# Patient Record
Sex: MALE | Race: WHITE | NOT HISPANIC OR LATINO | Employment: FULL TIME | URBAN - METROPOLITAN AREA
[De-identification: names, ages, dates, MRNs, and addresses within clinical notes are randomized per-mention and may not be internally consistent; named-entity substitution may affect disease eponyms.]

---

## 2019-02-01 ENCOUNTER — OFFICE VISIT - HEALTHEAST (OUTPATIENT)
Dept: FAMILY MEDICINE | Facility: CLINIC | Age: 59
End: 2019-02-01

## 2019-02-01 DIAGNOSIS — I49.9 IRREGULAR HEART BEAT: ICD-10-CM

## 2019-02-01 DIAGNOSIS — R07.9 CHEST PAIN, UNSPECIFIED TYPE: ICD-10-CM

## 2019-02-01 DIAGNOSIS — Z80.42 FAMILY HISTORY OF PROSTATE CANCER: ICD-10-CM

## 2019-02-01 DIAGNOSIS — R00.1 BRADYCARDIA: ICD-10-CM

## 2019-02-01 DIAGNOSIS — K40.90 INGUINAL HERNIA OF LEFT SIDE WITHOUT OBSTRUCTION OR GANGRENE: ICD-10-CM

## 2019-02-01 DIAGNOSIS — E78.00 HYPERCHOLESTEROLEMIA: ICD-10-CM

## 2019-02-01 DIAGNOSIS — R73.01 ELEVATED FASTING GLUCOSE: ICD-10-CM

## 2019-02-01 DIAGNOSIS — Z00.00 ROUTINE GENERAL MEDICAL EXAMINATION AT A HEALTH CARE FACILITY: ICD-10-CM

## 2019-02-01 LAB
ALBUMIN SERPL-MCNC: 4.3 G/DL (ref 3.5–5)
ALP SERPL-CCNC: 59 U/L (ref 45–120)
ALT SERPL W P-5'-P-CCNC: 20 U/L (ref 0–45)
ANION GAP SERPL CALCULATED.3IONS-SCNC: 13 MMOL/L (ref 5–18)
AST SERPL W P-5'-P-CCNC: 23 U/L (ref 0–40)
ATRIAL RATE - MUSE: 44 BPM
BILIRUB SERPL-MCNC: 0.5 MG/DL (ref 0–1)
BUN SERPL-MCNC: 23 MG/DL (ref 8–22)
CALCIUM SERPL-MCNC: 9.7 MG/DL (ref 8.5–10.5)
CHLORIDE BLD-SCNC: 105 MMOL/L (ref 98–107)
CHOLEST SERPL-MCNC: 200 MG/DL
CO2 SERPL-SCNC: 22 MMOL/L (ref 22–31)
CREAT SERPL-MCNC: 0.99 MG/DL (ref 0.7–1.3)
DIASTOLIC BLOOD PRESSURE - MUSE: NORMAL MMHG
FASTING STATUS PATIENT QL REPORTED: YES
GFR SERPL CREATININE-BSD FRML MDRD: >60 ML/MIN/1.73M2
GLUCOSE BLD-MCNC: 108 MG/DL (ref 70–125)
HBA1C MFR BLD: 5.5 % (ref 3.5–6)
HDLC SERPL-MCNC: 62 MG/DL
INTERPRETATION ECG - MUSE: NORMAL
LDLC SERPL CALC-MCNC: 126 MG/DL
MAGNESIUM SERPL-MCNC: 2.5 MG/DL (ref 1.8–2.6)
P AXIS - MUSE: 56 DEGREES
POTASSIUM BLD-SCNC: 4.6 MMOL/L (ref 3.5–5)
PR INTERVAL - MUSE: 170 MS
PROT SERPL-MCNC: 7.2 G/DL (ref 6–8)
PSA SERPL-MCNC: 1.5 NG/ML (ref 0–3.5)
QRS DURATION - MUSE: 108 MS
QT - MUSE: 428 MS
QTC - MUSE: 365 MS
R AXIS - MUSE: 34 DEGREES
SODIUM SERPL-SCNC: 140 MMOL/L (ref 136–145)
SYSTOLIC BLOOD PRESSURE - MUSE: NORMAL MMHG
T AXIS - MUSE: 34 DEGREES
TRIGL SERPL-MCNC: 60 MG/DL
TSH SERPL DL<=0.005 MIU/L-ACNC: 2.06 UIU/ML (ref 0.3–5)
VENTRICULAR RATE- MUSE: 44 BPM

## 2019-02-01 ASSESSMENT — MIFFLIN-ST. JEOR: SCORE: 1693.29

## 2019-02-07 ENCOUNTER — COMMUNICATION - HEALTHEAST (OUTPATIENT)
Dept: FAMILY MEDICINE | Facility: CLINIC | Age: 59
End: 2019-02-07

## 2019-02-14 ENCOUNTER — OFFICE VISIT - HEALTHEAST (OUTPATIENT)
Dept: CARDIOLOGY | Facility: CLINIC | Age: 59
End: 2019-02-14

## 2019-02-14 DIAGNOSIS — I49.9 IRREGULAR HEART BEAT: ICD-10-CM

## 2019-02-14 DIAGNOSIS — R07.2 PRECORDIAL PAIN: ICD-10-CM

## 2019-02-14 ASSESSMENT — MIFFLIN-ST. JEOR: SCORE: 1679.68

## 2019-02-15 ENCOUNTER — OFFICE VISIT - HEALTHEAST (OUTPATIENT)
Dept: SURGERY | Facility: CLINIC | Age: 59
End: 2019-02-15

## 2019-02-15 DIAGNOSIS — K40.90 LEFT INGUINAL HERNIA: ICD-10-CM

## 2019-02-15 ASSESSMENT — MIFFLIN-ST. JEOR: SCORE: 1699.64

## 2019-03-01 ENCOUNTER — HOSPITAL ENCOUNTER (OUTPATIENT)
Dept: CARDIOLOGY | Facility: HOSPITAL | Age: 59
Discharge: HOME | End: 2019-03-01
Attending: INTERNAL MEDICINE

## 2019-03-01 DIAGNOSIS — I49.9 IRREGULAR HEART BEAT: ICD-10-CM

## 2019-03-01 DIAGNOSIS — R07.2 PRECORDIAL PAIN: ICD-10-CM

## 2019-03-01 LAB
CV STRESS CURRENT BP HE: NORMAL
CV STRESS CURRENT HR HE: 100
CV STRESS CURRENT HR HE: 102
CV STRESS CURRENT HR HE: 103
CV STRESS CURRENT HR HE: 104
CV STRESS CURRENT HR HE: 104
CV STRESS CURRENT HR HE: 118
CV STRESS CURRENT HR HE: 123
CV STRESS CURRENT HR HE: 125
CV STRESS CURRENT HR HE: 127
CV STRESS CURRENT HR HE: 136
CV STRESS CURRENT HR HE: 137
CV STRESS CURRENT HR HE: 139
CV STRESS CURRENT HR HE: 141
CV STRESS CURRENT HR HE: 141
CV STRESS CURRENT HR HE: 142
CV STRESS CURRENT HR HE: 143
CV STRESS CURRENT HR HE: 143
CV STRESS CURRENT HR HE: 153
CV STRESS CURRENT HR HE: 164
CV STRESS CURRENT HR HE: 166
CV STRESS CURRENT HR HE: 169
CV STRESS CURRENT HR HE: 206
CV STRESS CURRENT HR HE: 218
CV STRESS CURRENT HR HE: 236
CV STRESS CURRENT HR HE: 58
CV STRESS CURRENT HR HE: 69
CV STRESS CURRENT HR HE: 82
CV STRESS CURRENT HR HE: 83
CV STRESS CURRENT HR HE: 84
CV STRESS CURRENT HR HE: 85
CV STRESS CURRENT HR HE: 88
CV STRESS CURRENT HR HE: 88
CV STRESS CURRENT HR HE: 90
CV STRESS CURRENT HR HE: 90
CV STRESS CURRENT HR HE: 91
CV STRESS CURRENT HR HE: 93
CV STRESS CURRENT HR HE: 94
CV STRESS CURRENT HR HE: 94
CV STRESS CURRENT HR HE: 96
CV STRESS CURRENT HR HE: 97
CV STRESS CURRENT HR HE: 98
CV STRESS CURRENT HR HE: 98
CV STRESS DEVIATION TIME HE: NORMAL
CV STRESS ECHO PERCENT HR HE: NORMAL
CV STRESS EXERCISE STAGE HE: NORMAL
CV STRESS FINAL RESTING BP HE: NORMAL
CV STRESS FINAL RESTING HR HE: 91
CV STRESS MAX HR HE: 245
CV STRESS MAX TREADMILL GRADE HE: 14
CV STRESS MAX TREADMILL SPEED HE: 3.4
CV STRESS PEAK DIA BP HE: NORMAL
CV STRESS PEAK SYS BP HE: NORMAL
CV STRESS PHASE HE: NORMAL
CV STRESS PROTOCOL HE: NORMAL
CV STRESS RESTING PT POSITION HE: NORMAL
CV STRESS RESTING PT POSITION HE: NORMAL
CV STRESS ST DEVIATION AMOUNT HE: NORMAL
CV STRESS ST DEVIATION ELEVATION HE: NORMAL
CV STRESS ST EVELATION AMOUNT HE: NORMAL
CV STRESS TEST TYPE HE: NORMAL
CV STRESS TOTAL STAGE TIME MIN 1 HE: NORMAL
ECHO EJECTION FRACTION ESTIMATED: 60 %
STRESS ECHO BASELINE BP: NORMAL
STRESS ECHO BASELINE HR: 59
STRESS ECHO CALCULATED PERCENT HR: 150 %
STRESS ECHO LAST STRESS BP: NORMAL
STRESS ECHO LAST STRESS HR: 169
STRESS ECHO POST ESTIMATED WORKLOAD: 8.7
STRESS ECHO POST EXERCISE DUR MIN: 7
STRESS ECHO POST EXERCISE DUR SEC: 15
STRESS ECHO TARGET HR: 139

## 2019-04-04 ENCOUNTER — OFFICE VISIT - HEALTHEAST (OUTPATIENT)
Dept: CARDIOLOGY | Facility: CLINIC | Age: 59
End: 2019-04-04

## 2019-04-04 DIAGNOSIS — R07.2 PRECORDIAL PAIN: ICD-10-CM

## 2019-04-04 DIAGNOSIS — I47.10 PSVT (PAROXYSMAL SUPRAVENTRICULAR TACHYCARDIA) (H): ICD-10-CM

## 2019-04-04 ASSESSMENT — MIFFLIN-ST. JEOR: SCORE: 1697.82

## 2019-06-03 ENCOUNTER — COMMUNICATION - HEALTHEAST (OUTPATIENT)
Dept: CARDIOLOGY | Facility: CLINIC | Age: 59
End: 2019-06-03

## 2019-06-03 DIAGNOSIS — I47.10 PSVT (PAROXYSMAL SUPRAVENTRICULAR TACHYCARDIA) (H): ICD-10-CM

## 2019-07-07 ENCOUNTER — RECORDS - HEALTHEAST (OUTPATIENT)
Dept: ADMINISTRATIVE | Facility: OTHER | Age: 59
End: 2019-07-07

## 2019-07-08 ENCOUNTER — OFFICE VISIT (OUTPATIENT)
Dept: FAMILY MEDICINE | Facility: CLINIC | Age: 59
End: 2019-07-08

## 2019-07-08 ENCOUNTER — COMMUNICATION - HEALTHEAST (OUTPATIENT)
Dept: FAMILY MEDICINE | Facility: CLINIC | Age: 59
End: 2019-07-08

## 2019-07-08 ENCOUNTER — RECORDS - HEALTHEAST (OUTPATIENT)
Dept: ADMINISTRATIVE | Facility: OTHER | Age: 59
End: 2019-07-08

## 2019-07-08 VITALS
BODY MASS INDEX: 26.6 KG/M2 | RESPIRATION RATE: 18 BRPM | OXYGEN SATURATION: 98 % | HEART RATE: 54 BPM | TEMPERATURE: 98.7 F | HEIGHT: 71 IN | WEIGHT: 190 LBS | SYSTOLIC BLOOD PRESSURE: 116 MMHG | DIASTOLIC BLOOD PRESSURE: 76 MMHG

## 2019-07-08 DIAGNOSIS — S52.501A CLOSED FRACTURE OF DISTAL END OF RIGHT RADIUS, UNSPECIFIED FRACTURE MORPHOLOGY, INITIAL ENCOUNTER: Primary | ICD-10-CM

## 2019-07-08 DIAGNOSIS — Z76.89 HEALTH CARE HOME: ICD-10-CM

## 2019-07-08 DIAGNOSIS — I47.10 SUPRAVENTRICULAR TACHYCARDIA (H): ICD-10-CM

## 2019-07-08 DIAGNOSIS — K40.90 UNILATERAL INGUINAL HERNIA WITHOUT OBSTRUCTION OR GANGRENE, RECURRENCE NOT SPECIFIED: ICD-10-CM

## 2019-07-08 DIAGNOSIS — Z71.89 ACP (ADVANCE CARE PLANNING): ICD-10-CM

## 2019-07-08 DIAGNOSIS — K21.9 GASTROESOPHAGEAL REFLUX DISEASE WITHOUT ESOPHAGITIS: ICD-10-CM

## 2019-07-08 DIAGNOSIS — Z01.818 PRE-OP EXAM: ICD-10-CM

## 2019-07-08 LAB — HEMOGLOBIN: 14.7 G/DL (ref 13.3–17.7)

## 2019-07-08 PROCEDURE — 36415 COLL VENOUS BLD VENIPUNCTURE: CPT | Performed by: FAMILY MEDICINE

## 2019-07-08 PROCEDURE — 93000 ELECTROCARDIOGRAM COMPLETE: CPT | Performed by: FAMILY MEDICINE

## 2019-07-08 PROCEDURE — 85018 HEMOGLOBIN: CPT | Performed by: FAMILY MEDICINE

## 2019-07-08 PROCEDURE — 99203 OFFICE O/P NEW LOW 30 MIN: CPT | Performed by: FAMILY MEDICINE

## 2019-07-08 RX ORDER — OMEPRAZOLE 20 MG/1
20 TABLET, DELAYED RELEASE ORAL DAILY
COMMUNITY
Start: 2019-07-08 | End: 2023-09-28

## 2019-07-08 RX ORDER — METOPROLOL TARTRATE 50 MG
25 TABLET ORAL
COMMUNITY
Start: 2019-06-03 | End: 2021-09-20

## 2019-07-08 SDOH — HEALTH STABILITY: MENTAL HEALTH: HOW MANY STANDARD DRINKS CONTAINING ALCOHOL DO YOU HAVE ON A TYPICAL DAY?: 1 OR 2

## 2019-07-08 SDOH — ECONOMIC STABILITY: FOOD INSECURITY: WITHIN THE PAST 12 MONTHS, THE FOOD YOU BOUGHT JUST DIDN'T LAST AND YOU DIDN'T HAVE MONEY TO GET MORE.: NEVER TRUE

## 2019-07-08 SDOH — ECONOMIC STABILITY: FOOD INSECURITY: WITHIN THE PAST 12 MONTHS, YOU WORRIED THAT YOUR FOOD WOULD RUN OUT BEFORE YOU GOT MONEY TO BUY MORE.: NEVER TRUE

## 2019-07-08 SDOH — ECONOMIC STABILITY: TRANSPORTATION INSECURITY
IN THE PAST 12 MONTHS, HAS THE LACK OF TRANSPORTATION KEPT YOU FROM MEDICAL APPOINTMENTS OR FROM GETTING MEDICATIONS?: NO

## 2019-07-08 SDOH — HEALTH STABILITY: PHYSICAL HEALTH: ON AVERAGE, HOW MANY MINUTES DO YOU ENGAGE IN EXERCISE AT THIS LEVEL?: 90 MIN

## 2019-07-08 SDOH — ECONOMIC STABILITY: TRANSPORTATION INSECURITY
IN THE PAST 12 MONTHS, HAS LACK OF TRANSPORTATION KEPT YOU FROM MEETINGS, WORK, OR FROM GETTING THINGS NEEDED FOR DAILY LIVING?: NO

## 2019-07-08 SDOH — HEALTH STABILITY: MENTAL HEALTH
STRESS IS WHEN SOMEONE FEELS TENSE, NERVOUS, ANXIOUS, OR CAN'T SLEEP AT NIGHT BECAUSE THEIR MIND IS TROUBLED. HOW STRESSED ARE YOU?: NOT AT ALL

## 2019-07-08 SDOH — ECONOMIC STABILITY: INCOME INSECURITY: HOW HARD IS IT FOR YOU TO PAY FOR THE VERY BASICS LIKE FOOD, HOUSING, MEDICAL CARE, AND HEATING?: NOT HARD AT ALL

## 2019-07-08 SDOH — HEALTH STABILITY: MENTAL HEALTH: HOW OFTEN DO YOU HAVE 6 OR MORE DRINKS ON ONE OCCASION?: NEVER

## 2019-07-08 SDOH — HEALTH STABILITY: PHYSICAL HEALTH: ON AVERAGE, HOW MANY DAYS PER WEEK DO YOU ENGAGE IN MODERATE TO STRENUOUS EXERCISE (LIKE A BRISK WALK)?: 5 DAYS

## 2019-07-08 SDOH — HEALTH STABILITY: MENTAL HEALTH: HOW OFTEN DO YOU HAVE A DRINK CONTAINING ALCOHOL?: 4 OR MORE TIMES A WEEK

## 2019-07-08 ASSESSMENT — MIFFLIN-ST. JEOR: SCORE: 1701.02

## 2019-07-08 NOTE — PROGRESS NOTES
[facility]  [address]  [address]  [facility] [address]  [phone]  Dept: [phone]    PRE-OP EVALUATION:  Today's date: 2019    Saurabh Morel (: 1960) presents for pre-operative evaluation assessment as requested by Dr. Angela.  He requires evaluation and anesthesia risk assessment prior to undergoing surgery/procedure for treatment of right wrist surgery.    Also left inguinal hernia    Proposed Surgery/ Procedure: right wrist surgery/ hernia repair  Date of Surgery/ Procedure: 19, hernia 2019  Time of Surgery/ Procedure: AM  Hospital/Surgical Facility: [facility] in [place], hernia [facility]  Fax number for surgical facility: [phone]  Primary Physician: Emerald Ackerman  Type of Anesthesia Anticipated: to be determined    Patient has a Health Care Directive or Living Will:  NO    1. NO - Do you have a history of heart attack, stroke, stent, bypass or surgery on an artery in the head, neck, heart or legs?  2. NO - Do you ever have any pain or discomfort in your chest?  3. NO - Do you have a history of  Heart Failure?  4. NO - Are you troubled by shortness of breath when: walking on the level, up a slight hill or at night?  5. NO - Do you currently have a cold, bronchitis or other respiratory infection?  6. NO - Do you have a cough, shortness of breath or wheezing?  7. NO - Do you sometimes get pains in the calves of your legs when you walk?  8. NO - Do you or anyone in your family have previous history of blood clots?  9. NO - Do you or does anyone in your family have a serious bleeding problem such as prolonged bleeding following surgeries or cuts?  10. NO - Have you ever had problems with anemia or been told to take iron pills?  11. NO - Have you had any abnormal blood loss such as black, tarry or bloody stools, or abnormal vaginal bleeding?  12. NO - Have you ever had a blood transfusion?  13. NO - Have you or any of your  relatives ever had problems with anesthesia?  14. NO - Do you have sleep apnea, excessive snoring or daytime drowsiness?  15. NO - Do you have any prosthetic heart valves?  16. NO - Do you have prosthetic joints?  17. NO - Is there any chance that you may be pregnant?      HPI:     HPI related to upcoming procedure: Right radial fracture care      See problem list for active medical problems.  Problems all longstanding and stable, except as noted/documented.  See ROS for pertinent symptoms related to these conditions.      MEDICAL HISTORY:     Patient Active Problem List    Diagnosis Date Noted     ACP (advance care planning) 07/08/2019     Priority: Medium     Paperwork given today       Supraventricular tachycardia (H) 07/08/2019     Priority: Medium     Unilateral inguinal hernia without obstruction or gangrene, recurrence not specified 07/08/2019     Priority: Medium     Gastroesophageal reflux disease without esophagitis 07/08/2019     Priority: Medium     Health Care Home 07/08/2019     Priority: Low      Past Medical History:   Diagnosis Date     Gastroesophageal reflux disease without esophagitis 7/8/2019     Supraventricular tachycardia (H) 7/8/2019     Unilateral inguinal hernia without obstruction or gangrene, recurrence not specified 7/8/2019     Past Surgical History:   Procedure Laterality Date     APPENDECTOMY  age 41's     HC TOOTH EXTRACTION W/FORCEP  age 19    wisdom     HERNIA REPAIR, INGUINAL RT/LT Right 2012     VASECTOMY  age 31's     Current Outpatient Medications   Medication Sig Dispense Refill     metoprolol tartrate (LOPRESSOR) 50 MG tablet Take 25 mg by mouth       omeprazole (PRILOSEC OTC) 20 MG EC tablet Take 1 tablet (20 mg) by mouth daily       OTC products: None, except as noted above    No Known Allergies   Latex Allergy: NO    Social History     Tobacco Use     Smoking status: Never Smoker     Smokeless tobacco: Never Used   Substance Use Topics     Alcohol use: Not on file  "    History   Drug Use Not on file       REVIEW OF SYSTEMS:   CONSTITUTIONAL: NEGATIVE for fever, chills, change in weight  ENT/MOUTH: NEGATIVE for ear, mouth and throat problems  RESP: NEGATIVE for significant cough or SOB  CV: NEGATIVE for chest pain, palpitations or peripheral edema    EXAM:   /76 (BP Location: Left arm, Patient Position: Sitting, Cuff Size: Adult Large)   Pulse 54   Temp 98.7  F (37.1  C) (Oral)   Resp 18   Ht 1.791 m (5' 10.5\")   Wt 86.2 kg (190 lb)   SpO2 98%   BMI 26.88 kg/m    GENERAL APPEARANCE: healthy, alert and no distress  HENT: ear canals and TM's normal and nose and mouth without ulcers or lesions  RESP: lungs clear to auscultation - no rales, rhonchi or wheezes  CV: regular rate and rhythm, normal S1 S2, no S3 or S4 and no murmur, click or rub   ABDOMEN: soft, nontender, no HSM or masses and bowel sounds normal  Groin not examined  NEURO: Normal strength and tone, sensory exam grossly normal, mentation intact and speech normal    DIAGNOSTICS:   EKG: sinus bradycardia, normal axis, normal intervals, no acute ST/T changes c/w ischemia, no LVH by voltage criteria, there are no prior tracings available  HGB:14.7 gm/dl       IMPRESSION:   Diagnosis/reason for consult: (S52.501A) Closed fracture of distal end of right radius, unspecified fracture morphology, initial encounter  (primary encounter diagnosis)    K40.90 Right inguinal hernia    (Z01.818) Pre-op exam      (I47.1) Supraventricular tachycardia (H)  Plan: well controlled with betablocker          The proposed surgical procedure is considered LOW risk.    REVISED CARDIAC RISK INDEX  The patient has the following serious cardiovascular risks for perioperative complications such as (MI, PE, VFib and 3  AV Block): Previous SVT now controlled with beta blocker  No serious cardiac risks  INTERPRETATION: 1 risks: Class II (low risk - 0.9% complication rate)    The patient has the following additional risks for perioperative " complications:  No identified additional risks      ICD-10-CM    1. Health Care Home Z76.89    2. ACP (advance care planning) Z71.89        RECOMMENDATIONS:     --Consult hospital rounder / IM to assist post-op medical management    --Patient is to take all scheduled medications on the day of surgery EXCEPT for modifications listed below.    APPROVAL GIVEN to proceed with proposed procedure, without further diagnostic evaluation       Signed Electronically by: Emerald Ackerman MD    Copy of this evaluation report is provided to requesting physician.

## 2019-07-08 NOTE — LETTER
[facility]                 [facility]  [address] [facility] [address]  [place]  [address]        For Emergencies:  Call 911      For Clinic Appointments:   [phone]                     [facility]  [address]  [address]  [facility] [id number] [phone]  Dept: [phone]    PRE-OP EVALUATION:  Today's date: 2019    Saurabh Morel (: 1960) presents for pre-operative evaluation assessment as requested by Dr. Angela.  He requires evaluation and anesthesia risk assessment prior to undergoing surgery/procedure for treatment of right wrist surgery.    Proposed Surgery/ Procedure: right wrist surgery  Date of Surgery/ Procedure: 19  Time of Surgery/ Procedure: AM  Hospital/Surgical Facility: [facility] in [place]  Fax number for surgical facility: [phone]  Primary Physician: Emerald Ackerman  Type of Anesthesia Anticipated: to be determined    Patient has a Health Care Directive or Living Will:  NO    1. NO - Do you have a history of heart attack, stroke, stent, bypass or surgery on an artery in the head, neck, heart or legs?  2. NO - Do you ever have any pain or discomfort in your chest?  3. NO - Do you have a history of  Heart Failure?  4. NO - Are you troubled by shortness of breath when: walking on the level, up a slight hill or at night?  5. NO - Do you currently have a cold, bronchitis or other respiratory infection?  6. NO - Do you have a cough, shortness of breath or wheezing?  7. NO - Do you sometimes get pains in the calves of your legs when you walk?  8. NO - Do you or anyone in your family have previous history of blood clots?  9. NO - Do you or does anyone in your family have a serious bleeding problem such as prolonged bleeding following surgeries or cuts?  10. NO - Have you ever had problems with anemia or been told to take iron pills?  11. NO - Have you had any abnormal  blood loss such as black, tarry or bloody stools, or abnormal vaginal bleeding?  12. NO - Have you ever had a blood transfusion?  13. NO - Have you or any of your relatives ever had problems with anesthesia?  14. NO - Do you have sleep apnea, excessive snoring or daytime drowsiness?  15. NO - Do you have any prosthetic heart valves?  16. NO - Do you have prosthetic joints?  17. NO - Is there any chance that you may be pregnant?      HPI:     HPI related to upcoming procedure: Right radial fracture care      See problem list for active medical problems.  Problems all longstanding and stable, except as noted/documented.  See ROS for pertinent symptoms related to these conditions.      MEDICAL HISTORY:     Patient Active Problem List    Diagnosis Date Noted     ACP (advance care planning) 07/08/2019     Priority: Medium     Paperwork given today       Supraventricular tachycardia (H) 07/08/2019     Priority: Medium     Unilateral inguinal hernia without obstruction or gangrene, recurrence not specified 07/08/2019     Priority: Medium     Gastroesophageal reflux disease without esophagitis 07/08/2019     Priority: Medium     Health Care Home 07/08/2019     Priority: Low      Past Medical History:   Diagnosis Date     Gastroesophageal reflux disease without esophagitis 7/8/2019     Supraventricular tachycardia (H) 7/8/2019     Unilateral inguinal hernia without obstruction or gangrene, recurrence not specified 7/8/2019     Past Surgical History:   Procedure Laterality Date     APPENDECTOMY  age 41's     HC TOOTH EXTRACTION W/FORCEP  age 19    wisdom     HERNIA REPAIR, INGUINAL RT/LT Right 2012     VASECTOMY  age 31's     Current Outpatient Medications   Medication Sig Dispense Refill     metoprolol tartrate (LOPRESSOR) 50 MG tablet Take 25 mg by mouth       omeprazole (PRILOSEC OTC) 20 MG EC tablet Take 1 tablet (20 mg) by mouth daily       OTC products: None, except as noted above    No Known Allergies   Latex Allergy:  "NO    Social History     Tobacco Use     Smoking status: Never Smoker     Smokeless tobacco: Never Used   Substance Use Topics     Alcohol use: Not on file     History   Drug Use Not on file       REVIEW OF SYSTEMS:   CONSTITUTIONAL: NEGATIVE for fever, chills, change in weight  ENT/MOUTH: NEGATIVE for ear, mouth and throat problems  RESP: NEGATIVE for significant cough or SOB  CV: NEGATIVE for chest pain, palpitations or peripheral edema    EXAM:   /76 (BP Location: Left arm, Patient Position: Sitting, Cuff Size: Adult Large)   Pulse 54   Temp 98.7  F (37.1  C) (Oral)   Resp 18   Ht 1.791 m (5' 10.5\")   Wt 86.2 kg (190 lb)   SpO2 98%   BMI 26.88 kg/m    GENERAL APPEARANCE: healthy, alert and no distress  HENT: ear canals and TM's normal and nose and mouth without ulcers or lesions  RESP: lungs clear to auscultation - no rales, rhonchi or wheezes  CV: regular rate and rhythm, normal S1 S2, no S3 or S4 and no murmur, click or rub   ABDOMEN: soft, nontender, no HSM or masses and bowel sounds normal  NEURO: Normal strength and tone, sensory exam grossly normal, mentation intact and speech normal    DIAGNOSTICS:   EKG: sinus bradycardia, normal axis, normal intervals, no acute ST/T changes c/w ischemia, no LVH by voltage criteria, there are no prior tracings available  HGB:14.7 gm/dl       IMPRESSION:   Diagnosis/reason for consult: (S52.501A) Closed fracture of distal end of right radius, unspecified fracture morphology, initial encounter  (primary encounter diagnosis)      (Z01.818) Pre-op exam      (I47.1) Supraventricular tachycardia (H)  Plan: well controlled with betablocker          The proposed surgical procedure is considered LOW risk.    REVISED CARDIAC RISK INDEX  The patient has the following serious cardiovascular risks for perioperative complications such as (MI, PE, VFib and 3  AV Block): Previous SVT now controlled with beta blocker  No serious cardiac risks  INTERPRETATION: 1 risks: Class " II (low risk - 0.9% complication rate)    The patient has the following additional risks for perioperative complications:  No identified additional risks      ICD-10-CM    1. Health Care Home Z76.89    2. ACP (advance care planning) Z71.89        RECOMMENDATIONS:     --Consult hospital rounder / IM to assist post-op medical management    --Patient is to take all scheduled medications on the day of surgery EXCEPT for modifications listed below.    APPROVAL GIVEN to proceed with proposed procedure, without further diagnostic evaluation       Signed Electronically by: Emerald Ackerman MD    Copy of this evaluation report is provided to requesting physician.

## 2019-07-08 NOTE — LETTER
[facility]                 [facility]  [address]  [place]  [address]        For Emergencies:  Call 911      For Clinic Appointments:   [phone]

## 2019-07-26 ENCOUNTER — RECORDS - HEALTHEAST (OUTPATIENT)
Dept: ADMINISTRATIVE | Facility: OTHER | Age: 59
End: 2019-07-26

## 2019-07-30 ENCOUNTER — TELEPHONE (OUTPATIENT)
Dept: FAMILY MEDICINE | Facility: CLINIC | Age: 59
End: 2019-07-30

## 2019-07-30 ENCOUNTER — ANESTHESIA - HEALTHEAST (OUTPATIENT)
Dept: SURGERY | Facility: HOSPITAL | Age: 59
End: 2019-07-30

## 2019-07-30 NOTE — TELEPHONE ENCOUNTER
"Dionna called back stating the pre-op if you choose to addend needs to state \"open inguinal hernia repair\"    ThanksAminah"

## 2019-07-30 NOTE — TELEPHONE ENCOUNTER
Saurabh is having left inguinal hernia repair tomorrow at [facility] and needs his wrist surgery pre-op from 7/8/19 to be addended for this surgery.  The surgeon is Dr. Khan.    Dionna the RN surgical admin called requesting this to be faxed to [phone].  Her number for any questions [phone].      Thanks, Yesenia

## 2019-07-31 ENCOUNTER — SURGERY - HEALTHEAST (OUTPATIENT)
Dept: SURGERY | Facility: HOSPITAL | Age: 59
End: 2019-07-31

## 2019-08-20 ENCOUNTER — OFFICE VISIT - HEALTHEAST (OUTPATIENT)
Dept: SURGERY | Facility: CLINIC | Age: 59
End: 2019-08-20

## 2019-08-20 ASSESSMENT — MIFFLIN-ST. JEOR: SCORE: 1700.1

## 2019-08-22 ENCOUNTER — COMMUNICATION - HEALTHEAST (OUTPATIENT)
Dept: FAMILY MEDICINE | Facility: CLINIC | Age: 59
End: 2019-08-22

## 2019-08-22 DIAGNOSIS — K21.9 GASTROESOPHAGEAL REFLUX DISEASE WITHOUT ESOPHAGITIS: ICD-10-CM

## 2019-08-23 ENCOUNTER — RECORDS - HEALTHEAST (OUTPATIENT)
Dept: ADMINISTRATIVE | Facility: OTHER | Age: 59
End: 2019-08-23

## 2019-10-04 ENCOUNTER — RECORDS - HEALTHEAST (OUTPATIENT)
Dept: ADMINISTRATIVE | Facility: OTHER | Age: 59
End: 2019-10-04

## 2019-12-03 ENCOUNTER — OFFICE VISIT - HEALTHEAST (OUTPATIENT)
Dept: CARDIOLOGY | Facility: CLINIC | Age: 59
End: 2019-12-03

## 2019-12-03 DIAGNOSIS — I47.10 PSVT (PAROXYSMAL SUPRAVENTRICULAR TACHYCARDIA) (H): ICD-10-CM

## 2019-12-03 ASSESSMENT — MIFFLIN-ST. JEOR: SCORE: 1686.49

## 2020-08-11 ENCOUNTER — COMMUNICATION - HEALTHEAST (OUTPATIENT)
Dept: FAMILY MEDICINE | Facility: CLINIC | Age: 60
End: 2020-08-11

## 2020-08-11 DIAGNOSIS — K21.9 GASTROESOPHAGEAL REFLUX DISEASE WITHOUT ESOPHAGITIS: ICD-10-CM

## 2020-09-02 ENCOUNTER — RECORDS - HEALTHEAST (OUTPATIENT)
Dept: ADMINISTRATIVE | Facility: OTHER | Age: 60
End: 2020-09-02

## 2020-09-10 ENCOUNTER — COMMUNICATION - HEALTHEAST (OUTPATIENT)
Dept: CARDIOLOGY | Facility: CLINIC | Age: 60
End: 2020-09-10

## 2020-09-10 DIAGNOSIS — I47.10 PSVT (PAROXYSMAL SUPRAVENTRICULAR TACHYCARDIA) (H): ICD-10-CM

## 2021-01-14 ENCOUNTER — COMMUNICATION - HEALTHEAST (OUTPATIENT)
Dept: CARDIOLOGY | Facility: CLINIC | Age: 61
End: 2021-01-14

## 2021-01-14 DIAGNOSIS — I47.10 PSVT (PAROXYSMAL SUPRAVENTRICULAR TACHYCARDIA) (H): ICD-10-CM

## 2021-01-28 ENCOUNTER — OFFICE VISIT - HEALTHEAST (OUTPATIENT)
Dept: CARDIOLOGY | Facility: CLINIC | Age: 61
End: 2021-01-28

## 2021-01-28 DIAGNOSIS — I47.10 PSVT (PAROXYSMAL SUPRAVENTRICULAR TACHYCARDIA) (H): ICD-10-CM

## 2021-01-28 DIAGNOSIS — R07.2 PRECORDIAL PAIN: ICD-10-CM

## 2021-01-28 ASSESSMENT — MIFFLIN-ST. JEOR: SCORE: 1700.1

## 2021-05-30 NOTE — ANESTHESIA PREPROCEDURE EVALUATION
Anesthesia Evaluation      Patient summary reviewed     Airway   Mallampati: II  Neck ROM: full   Pulmonary - negative ROS and normal exam    breath sounds clear to auscultation                         Cardiovascular - normal exam  (+) dysrhythmias, ,     Rhythm: regular  Rate: normal,         Neuro/Psych - negative ROS     Endo/Other - negative ROS      GI/Hepatic/Renal       Other findings: H/o PSVT, neg str ess test, nl labs, snores      Dental - normal exam                        Anesthesia Plan  Planned anesthetic: MAC  Ketamine, lyrica  ASA 2     Anesthetic plan and risks discussed with: patient    Post-op plan: routine recovery

## 2021-05-30 NOTE — TELEPHONE ENCOUNTER
New Appointment Needed  What is the reason for the visit:    Pre-Op Appt Request  When is the surgery? :  07/08/19  Where is the surgery?:   [facility]  Who is the surgeon? :  Dr. Lane  What type of surgery is being done?: Orthopedic  Provider Preference: Any available  How soon do you need to be seen?: today  Waitlist offered?: No  Okay to leave a detailed message:  Yes. Scheduled appointment with Dr. Sands tomorrow with patient's wife, but [facility] called saying the surgery is happening the morning of 07/09/19/

## 2021-05-31 NOTE — ANESTHESIA CARE TRANSFER NOTE
Last vitals:   Vitals:    07/31/19 0904   BP: (P) 122/74   Pulse: (P) 70   Resp: (P) 14   Temp: 36.6  C (97.8  F)   SpO2: (P) 97%     Patient's level of consciousness is drowsy  Spontaneous respirations: yes  Maintains airway independently: yes  Dentition unchanged: yes  Oropharynx: oropharynx clear of all foreign objects    QCDR Measures:  ASA# 20 - Surgical Safety Checklist: WHO surgical safety checklist completed prior to induction    PQRS# 430 - Adult PONV Prevention: 4558F - Pt received => 2 anti-emetic agents (different classes) preop & intraop  ASA# 8 - Peds PONV Prevention: NA - Not pediatric patient, not GA or 2 or more risk factors NOT present  PQRS# 424 - Gale-op Temp Management: 4559F - At least one body temp DOCUMENTED => 35.5C or 95.9F within required timeframe  PQRS# 426 - PACU Transfer Protocol: - Transfer of care checklist used  ASA# 14 - Acute Post-op Pain: ASA14B - Patient did NOT experience pain >= 7 out of 10

## 2021-05-31 NOTE — PROGRESS NOTES
"HPI: Pt is here for follow up of a left inguinal hernia repair three weeks ago with Dr Hutson.   he is doing well.  Pain is well controlled.  No difficulties with the surgical wound/wounds.  he is eating well and denies fever and chills.         /90 (Patient Site: Right Arm, Patient Position: Sitting, Cuff Size: Adult Regular)   Pulse 62   Ht 5' 10.5\" (1.791 m)   Wt 192 lb (87.1 kg)   SpO2 97%   BMI 27.16 kg/m      EXAM:  GENERAL:Appears well  ABDOMEN:  Soft, +BS  SURGICAL WOUNDS:  Incisions healing well, no enduration or drainage.        Assessment/Plan: . Doing well after surgery and should follow up as needed.  Radha Vann PA-C  [facility] Department of Surgery      "

## 2021-05-31 NOTE — ANESTHESIA POSTPROCEDURE EVALUATION
Patient: Saurabh Morel  OPEN LEFT INGUINAL HERNIA REPAIR WITH MESH  Anesthesia type: MAC    Patient location: home  Last vitals:   Vitals Value Taken Time   /80 7/31/2019 10:00 AM   Temp 36.6  C (97.8  F) 7/31/2019  9:04 AM   Pulse 58 7/31/2019 10:08 AM   Resp 16 7/31/2019 10:00 AM   SpO2 98 % 7/31/2019 10:08 AM   Vitals shown include unvalidated device data.  Post vital signs: stable  Level of consciousness: awake and responds to simple questions  Post-anesthesia pain: pain controlled  Post-anesthesia nausea and vomiting: no  Pulmonary: unassisted, return to baseline  Cardiovascular: stable and blood pressure at baseline  Hydration: adequate  Anesthetic events: no    QCDR Measures:  ASA# 11 - Gale-op Cardiac Arrest: ASA11B - Patient did NOT experience unanticipated cardiac arrest  ASA# 12 - Gale-op Mortality Rate: ASA12B - Patient did NOT die  ASA# 13 - PACU Re-Intubation Rate: ASA13B - Patient did NOT require a new airway mgmt  ASA# 10 - Composite Anes Safety: ASA10A - No serious adverse event    Additional Notes:

## 2021-05-31 NOTE — TELEPHONE ENCOUNTER
Medication Request  Medication name:     omeprazole (PRILOSEC) 20 MG capsule 90 capsule 3 12/2/2015  No   Sig - Route: Take 1 capsule (20 mg total) by mouth daily. - Oral   E-Prescribing Status: Receipt confirmed by pharmacy (12/2/2015  8:10 AM CST)       Pharmacy Name and Location: [id number] [address] AT [facility] [address]   Reason for request: The patient has been buying the above prescription over the counter. The insurance max has been met and now the patient would like to have it billed through the insurance company.   When did you use medication last?:  8/22/2019  Patient offered appointment:  patient declined  Okay to leave a detailed message: yes

## 2021-06-02 VITALS — HEIGHT: 72 IN | BODY MASS INDEX: 25.52 KG/M2 | WEIGHT: 188.4 LBS

## 2021-06-02 VITALS — WEIGHT: 184 LBS | BODY MASS INDEX: 24.92 KG/M2 | HEIGHT: 72 IN

## 2021-06-02 VITALS — BODY MASS INDEX: 25.47 KG/M2 | WEIGHT: 188 LBS | HEIGHT: 72 IN

## 2021-06-02 VITALS — HEIGHT: 72 IN | WEIGHT: 187 LBS | BODY MASS INDEX: 25.33 KG/M2

## 2021-06-03 ENCOUNTER — COMMUNICATION - HEALTHEAST (OUTPATIENT)
Dept: FAMILY MEDICINE | Facility: CLINIC | Age: 61
End: 2021-06-03

## 2021-06-03 VITALS — HEIGHT: 71 IN | WEIGHT: 192 LBS | BODY MASS INDEX: 26.88 KG/M2

## 2021-06-03 VITALS — BODY MASS INDEX: 26.13 KG/M2 | WEIGHT: 190 LBS | BODY MASS INDEX: 26.13 KG/M2 | WEIGHT: 190 LBS

## 2021-06-03 VITALS — HEIGHT: 71 IN | BODY MASS INDEX: 26.6 KG/M2 | WEIGHT: 190 LBS

## 2021-06-03 VITALS — HEIGHT: 72 IN | BODY MASS INDEX: 26.13 KG/M2 | HEIGHT: 72 IN | BODY MASS INDEX: 26.13 KG/M2

## 2021-06-03 NOTE — PROGRESS NOTES
[facility] HEART [facility]  Arrhythmia Clinic  Jimmy José    Referring: Dr.St Torres     Assessment:         PSVT: The patient has had trivial burden of arrhythmia over the past 12 months.  He had one symptomatic episode lasting less than 30 seconds.  The patient is tolerating his beta-blocker well.  The patient is aware of the availability of mapping and ablation of his arrhythmia but at this point continues to desire a conservative approach.    Cough and chest ache: The patient has had a chronic cough and chest ache which has been followed with by his primary.  The patient has some achiness with coughing but no clear anginal symptoms.  He does have a family history (father) but no other significant risk factors for CAD.      Recommendations:    The patient's beta-blocker will be changed from metoprolol tartrate to metoprolol succinate 25 mg daily for ease of compliance.    Follow-up with me in 1 year.  The patient will contact us if he has recurrent and troublesome symptoms in the interim.    I did asked the patient to contact us if his chest symptoms do not improve as he may be a candidate for cardiac CT angiogram to help sort through whether or not he might have any coronary disease.      Patient Active Problem List   Diagnosis     Gastroesophageal reflux disease without esophagitis     Family history of prostate cancer     Left inguinal hernia     Elevated fasting glucose     Hypercholesterolemia     Routine general medical examination at a [facility] facility     PSVT (paroxysmal supraventricular tachycardia) (H)     Precordial pain       Subjective:  Saurabh Morel (59 y.o. male) returns to the arrhythmia clinic for interval reevaluation of his PSVT.  The patient reports a single episode of heart racing earlier this summer.  The episode lasted less than 30 seconds and resolve spontaneously.  Otherwise he continues to be active active at his work.  He reports no orthopnea, PND or ankle edema.    Current  Outpatient Medications   Medication Sig Dispense Refill     ibuprofen (ADVIL,MOTRIN) 200 MG tablet Take 800 mg by mouth every morning.       multivitamin with minerals (THERA-M) 9 mg iron-400 mcg Tab tablet Take 1 tablet by mouth daily.       omeprazole (PRILOSEC) 20 MG capsule Take 1 capsule (20 mg total) by mouth daily. 90 capsule 3     metoprolol succinate (TOPROL-XL) 50 MG 24 hr tablet Take 0.5 tablets (25 mg total) by mouth daily. 30 tablet 3     No current facility-administered medications for this visit.        Review of Systems:   General: WNL  Eyes: WNL  Ears/Nose/Throat: WNL  Lungs: Cough  Heart: Chest Pain, Irregular Heartbeat  Stomach: WNL  Bladder: WNL  Muscle/Joints: WNL  Skin: WNL  Nervous System: WNL  Mental Health: WNL     Blood: WNL    Family History  Family History   Problem Relation Age of Onset     Hyperlipidemia Mother      Hypertension Mother      Liver cancer Mother      Parkinsonism Mother      Hyperlipidemia Father      Hypertension Father      Heart disease Father 94     CABG Father      Breast cancer Sister         ~55     Hyperlipidemia Brother      Hypertension Brother      Pancreatic cancer Paternal Aunt      Prostate cancer Paternal Grandfather      Hyperlipidemia Sister      Hypertension Sister      Hyperlipidemia Brother      Hypertension Brother      Esophageal cancer Brother 54     Other Brother 24        MVA     Hypertension Brother      Heart failure Paternal Aunt      Pancreatic cancer Paternal Aunt      No Medical Problems Daughter      No Medical Problems Son      No Medical Problems Son      Colon cancer Neg Hx      Drug abuse Neg Hx      Donna Parkinson White syndrome Neg Hx        Social History   reports that he has never smoked. He has never used smokeless tobacco. He reports current alcohol use of about 16.0 standard drinks of alcohol per week. He reports that he does not use drugs.    Objective:   Vital signs in last 24 hours:  /70 (Patient Site: Left Arm,  "Patient Position: Sitting, Cuff Size: Adult Regular)   Pulse (!) 58   Resp 14   Ht 5' 10.5\" (1.791 m)   Wt 189 lb (85.7 kg)   BMI 26.74 kg/m    Weight: Weight: 189 lb (85.7 kg)     Physical Exam:  General: The patient is alert oriented to person place and situation.  The patient is in no acute distress at the time of my evaluation.  Eyes: Pupils are equal, round, and reactive to light.  Conjunctiva and sclera are clear.  ENT: Oral mucosa is moist and without redness. No evident nasal discharge.  Pulmonary: Lungs are clear bilaterally with no rales, rhonchi, or wheezes.    Cardiovascular exam: Rhythm is regular. S1 and S2 are normal. No significant murmur is present. JVP is normal. Lower extremities demonstrate no significant edema. Distal pulses are intact bilaterally.  Abdomen is flat, soft, and nontender.  Musculoskeletal: Spine is straight. Extremities without deformity.  Neuro: Gait is normal.   Skin is warm, dry, and otherwise intact.      Cardiographics:       Lab Results:   Lab Results   Component Value Date     02/01/2019    K 4.6 02/01/2019     02/01/2019    CO2 22 02/01/2019    BUN 23 (H) 02/01/2019    CREATININE 0.99 02/01/2019    CALCIUM 9.7 02/01/2019     Lab Results   Component Value Date    WBC 4.7 12/03/2015    WBC 3.8 (L) 12/26/2014    HGB 15.1 12/03/2015    HCT 44.0 12/03/2015    MCV 87 12/03/2015     12/03/2015     No results found for: INR      Outside record review:        "

## 2021-06-04 VITALS
HEIGHT: 71 IN | DIASTOLIC BLOOD PRESSURE: 70 MMHG | HEART RATE: 58 BPM | BODY MASS INDEX: 26.46 KG/M2 | SYSTOLIC BLOOD PRESSURE: 116 MMHG | RESPIRATION RATE: 14 BRPM | WEIGHT: 189 LBS

## 2021-06-05 VITALS
SYSTOLIC BLOOD PRESSURE: 120 MMHG | BODY MASS INDEX: 26.88 KG/M2 | WEIGHT: 192 LBS | HEART RATE: 50 BPM | HEIGHT: 71 IN | RESPIRATION RATE: 16 BRPM | DIASTOLIC BLOOD PRESSURE: 80 MMHG

## 2021-06-10 NOTE — TELEPHONE ENCOUNTER
RN cannot approve Refill Request    RN can NOT refill this medication Protocol failed and NO refill given. Last office visit: Visit date not found Last Physical: Visit date not found Last MTM visit: Visit date not found Last visit same specialty: Visit date not found.  Next visit within 3 mo: Visit date not found  Next physical within 3 mo: Visit date not found      Lamar Loza, [facility] Connection Triage/Med Refill 8/11/2020    Requested Prescriptions   Pending Prescriptions Disp Refills     omeprazole (PRILOSEC) 20 MG capsule [Pharmacy Med Name: OMEPRAZOLE 20MG CAPSULES] 90 capsule 3     Sig: TAKE 1 CAPSULE(20 MG) BY MOUTH DAILY       GI Medications Refill Protocol Failed - 8/11/2020  3:29 AM        Failed - PCP or prescribing provider visit in last 12 or next 3 months.     Last office visit with prescriber/PCP: Visit date not found OR same dept: Visit date not found OR same specialty: Visit date not found  Last physical: Visit date not found Last MTM visit: Visit date not found   Next visit within 3 mo: Visit date not found  Next physical within 3 mo: Visit date not found  Prescriber OR PCP: Ines Shin MD  Last diagnosis associated with med order: 1. Gastroesophageal reflux disease without esophagitis  - omeprazole (PRILOSEC) 20 MG capsule [Pharmacy Med Name: OMEPRAZOLE 20MG CAPSULES]; TAKE 1 CAPSULE(20 MG) BY MOUTH DAILY  Dispense: 90 capsule; Refill: 3    If protocol passes may refill for 12 months if within 3 months of last provider visit (or a total of 15 months).

## 2021-06-18 NOTE — LETTER
Letter by Willam Mooney MD at      Author: Willam Mooney MD Service: -- Author Type: --    Filed:  Encounter Date: 2/7/2019 Status: (Other)       Saurabh Morel  [address] [place] [id number]             February 7, 2019         Dear Mr. Morel,    Below are the results from your recent visit:    Resulted Orders   Lipid Profile   Result Value Ref Range    Triglycerides 60 <=149 mg/dL    Cholesterol 200 (H) <=199 mg/dL    LDL Calculated 126 <=129 mg/dL    HDL Cholesterol 62 >=40 mg/dL    Patient Fasting > 8hrs? Yes    PSA (Prostatic-Specific Antigen), Annual Screen   Result Value Ref Range    PSA 1.5 0.0 - 3.5 ng/mL    Narrative    Method is Abbott Prostate-Specific Antigen (PSA)  Standard-WHO 1st International (90:10)   Glycosylated Hemoglobin A1c   Result Value Ref Range    Hemoglobin A1c 5.5 3.5 - 6.0 %   Thyroid Stimulating Hormone (TSH)   Result Value Ref Range    TSH 2.06 0.30 - 5.00 uIU/mL   Comprehensive Metabolic Panel   Result Value Ref Range    Sodium 140 136 - 145 mmol/L    Potassium 4.6 3.5 - 5.0 mmol/L    Chloride 105 98 - 107 mmol/L    CO2 22 22 - 31 mmol/L    Anion Gap, Calculation 13 5 - 18 mmol/L    Glucose 108 70 - 125 mg/dL    BUN 23 (H) 8 - 22 mg/dL    Creatinine 0.99 0.70 - 1.30 mg/dL    GFR MDRD Af Amer >60 >60 mL/min/1.73m2    GFR MDRD Non Af Amer >60 >60 mL/min/1.73m2    Bilirubin, Total 0.5 0.0 - 1.0 mg/dL    Calcium 9.7 8.5 - 10.5 mg/dL    Protein, Total 7.2 6.0 - 8.0 g/dL    Albumin 4.3 3.5 - 5.0 g/dL    Alkaline Phosphatase 59 45 - 120 U/L    AST 23 0 - 40 U/L    ALT 20 0 - 45 U/L    Narrative    Fasting Glucose reference range is 70-99 mg/dL per  American Diabetes Association (ADA) guidelines.   Magnesium   Result Value Ref Range    Magnesium 2.5 1.8 - 2.6 mg/dL     Your cholesterol panel overall looks quite good.  Given these numbers, I do not recommend a cholesterol lowering medication at this time.     Your comprehensive metabolic panel was normal.  This shows your  kidney function, electrolytes, liver function (for comprehensive only).     - your electrolytes were normal.     - your kidney function was normal.     - your liver functions was normal.      Your PSA was normal.  This is a screening test for prostate cancer.    Your TSH was normal.  TSH stands for thyroid stimulating hormone which is a measure of thyroid function.  We checked this because of the abnormal heart beats that you are describing.  There is no need for additional thyroid testing at this time.    Your hemoglobin A1c, a 3-month average of blood sugars, was in the normal range.  I recommend that we recheck it in 1 year given that your fasting glucose level was greater than 100.  This suggests that you are at risk for developing prediabetes.    Please call with questions or contact us using [employer].    Sincerely,        Electronically signed by Willam Mooney MD

## 2021-06-24 NOTE — PROGRESS NOTES
History:  Saurabh Morel is a 58 y.o. male who was referred for an inguinal hernia. He  presents today with complaints of symptoms within his left inguinal region.  It has been present for about a year intermittently.  He notes having a bulge in the left inguinal region.  He can lay down and massage it back in.  He also feels like he has some digestion problems when it is sticking out.  He is self-employed and does a lot of manual labor.  Yesterday when he was shoveling snow he could feel an increase in dull discomfort in the left inguinal region.  He does have a history of a right inguinal hernia.  This was repaired laparoscopically in 2015.    As a side note, the patient recently saw his primary.  He had mentioned some intermittent right chest pain and palpitations.  He saw cardiology yesterday.  He is waiting to set up a stress echo.    Allergies:  Demerol [meperidine]    Past Medical History:  GERD    Past Surgical History:  Open appendectomy  Laparoscopic right inguinal hernia repair    Medications:  Multivitamin  Prilosec    Family History:  Family History   Problem Relation Age of Onset     Hyperlipidemia Mother      Hypertension Mother      Liver cancer Mother      Parkinsonism Mother      Hyperlipidemia Father      Hypertension Father      Heart disease Father      Breast cancer Sister         ~55     Hyperlipidemia Brother      Hypertension Brother      Pancreatic cancer Paternal Aunt      Prostate cancer Paternal Grandfather      Hyperlipidemia Sister      Hypertension Sister      Hyperlipidemia Brother      Hypertension Brother      Hyperlipidemia Brother      Hypertension Brother      No Medical Problems Brother      No Medical Problems Brother      Heart failure Paternal Aunt      Pancreatic cancer Paternal Aunt      No Medical Problems Daughter      No Medical Problems Son      No Medical Problems Son      Colon cancer Neg Hx      Drug abuse Neg Hx      Donna Parkinson White syndrome Neg Hx   "      Social History:   reports that  has never smoked. he has never used smokeless tobacco. He reports that he drinks about 12.6 oz of alcohol per week. He reports that he does not use drugs.    Review of Systems:   General: No complaints or constitutional symptoms  Skin: No complaints or symptoms   Hematologic/Lymphatic: No symptoms or complaints  Psychiatric: No symptoms or complaints  Endocrine: No excessive fatigue, no hypermetabolic symptoms reported  Respiratory: No cough, shortness of breath, or wheezing  Cardiovascular: Intermittent chest pain  Gastrointestinal: As per HPI  Musculoskeletal: No recent injuries reported  Neurological: No focal neurologic defects reported.        Exam:  /69 (Patient Site: Right Arm, Patient Position: Sitting, Cuff Size: Adult Regular)   Pulse 72   Ht 5' 11.5\" (1.816 m)   Wt 188 lb 6.4 oz (85.5 kg)   SpO2 98%   BMI 25.91 kg/m    Body mass index is 25.91 kg/m .  General: Alert, cooperative, appears stated age   Skin: Skin color, texture, turgor normal, no rashes or lesions   Lymphatic: No obvious adenopathy, no swelling   Eyes: No scleral icterus, pupils equal  HENT: No traumatic injury to the head or face, no gross abnormalities  Lungs: Normal respiratory effort, breath sounds equal bilaterally  Heart: Regular rate and rhythm  Abdomen: Soft and nontender.  No umbilical or right inguinal hernia with Valsalva.  With Valsalva maneuver there is a small yet easily palpable and reducible left inguinal hernia.  Musculoskeletal: No obvious swelling  Neurologic: Grossly intact    Assessment/Plan:   1. Left inguinal hernia        Saurabh Morel is a 58 y.o. male with a left inguinal hernia.  I have discussed the pathophysiology of inguinal hernias at length as well as the surgical and non-operative management strategies.      In particular, the risks and benefits of laparoscopic vs. open inguinal hernia surgery were explained in detail which include, but are not limited to, " bleeding, infection of the mesh, recurrence of the hernia, chronic pain, poor cosmesis, the need for reoperative intervention, injury to vital structures, blood clots, heart attack, stroke and death.  Additionally, the risks of observation were also discussed in detail which include, but are not limited to, chronic pain, enlargement of the hernia, incarceration, strangulation and death.      Due to his history of laparoscopic inguinal hernia repair, the left inguinal hernia would ideally be approached from the open standpoint.  The patient was expecting this answer and is okay proceeding with an open left inguinal hernia repair.  Due to his recent possible cardiac issues, he will need to obtain clearance from cardiology before we schedule surgery.  He is hoping to get the stress echo done within a few weeks.  Once he obtains clearance, he can call our surgery scheduler and then we can schedule him within a couple weeks of that call around his desired date.  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      We discussed postoperative recovery and restrictions.  We will await his call back and then schedule an open inguinal hernia repair under MAC anesthetic at the outpatient surgery center.    Yesenia Hutson, DO  [facility]  [phone]

## 2021-06-25 NOTE — TELEPHONE ENCOUNTER
Prior Authorization Request  Who s requesting:  Pharmacy  [facility]   Medication Name: omeprazole     Insurance Plan: [employer]  Insurance Member ID Number:  [id number]  CoverMyMeds Key: N/A  Informed patient that prior authorizations can take up to 10 business days for response:   Yes  Okay to leave a detailed message: Yes

## 2021-06-27 NOTE — PROGRESS NOTES
Progress Notes by Fabio Cat MD at 2/14/2019  3:20 PM     Author: Fabio Cat MD Service: -- Author Type: Physician    Filed: 2/14/2019  4:09 PM Encounter Date: 2/14/2019 Status: Signed    : Fabio Cat MD (Physician)           Click to link to [facility] Heart [facility] HEART CARE NOTE    Thank you, Dr. Mooney, for asking us to see Saurabh Morel at the [facility] Heart Care Clinic.      Assessment/Recommendations   Shunt with atypical symptoms which may be related to myocardial ischemia but again atypical for this.  This could also be intermittent dysrhythmias such as atrial fibrillation or SVT.    I have recommended that he come in his heart rate the next time this happens and asked somebody else to count and if helpful.  He thinks that they may have a fit bit or other device at home and have asked him to wear it so the fit bit or other device could count the heart rate as well.    I would like to start with a stress echocardiogram to see if we can initiate any rhythm disturbances and to see if there is any evidence of structural heart disease or myocardial ischemia.    We talked about the possibility of a 30-day event recorder or even a [employer] this system but we will await the results of the stress echocardiogram and his findings when he comes to the heart rate.    His TSH was normal electrolytes were unremarkable.    Thank you for allowing us to participate in his care.         History of Present Illness    Mr. Saurabh Morel is a 58 y.o. male without known heart problems but does have a family history with his father having bypass surgery at age 60.  Over the last year he has had about 6 episodes that he describes in the following fashion.  He will get a discomfort in his chest which feels like a dull ache slightly to the right of the midline.  It is not severe in nature but is mild to moderate in nature.  Comes on rather abruptly.  He can be associated with him walking  around or moving around but it does not come on when he does the most heavy is physical exertion such as shoveling snow or shoveling soybeans.  It is not associated with dyspnea, diaphoresis, nausea or vomiting and does not radiate.  At that time is also checked his pulse and it seems fast and irregular.  He did not count it but it is just not normal.  The discomfort and the irregular heartbeat will last about 5 minutes and then will spontaneously go away.  This is happened about 6 times in the last 1 year.  It is unpredictable.  It does not typically happen at night.    He denies orthopnea, paroxysmal nocturnal dyspnea, peripheral edema, syncope or near syncopal episodes.  He has no history of rheumatic fever, cerebrovascular accident or TIA.    He does not have hypertension, diabetes and is never smoked cigarettes.  His father had bypass surgery at age 60s and his lipid panel is mildly elevated as noted below.    ECG: Personally reviewed.  Sinus bradycardia but otherwise unremarkable.     Physical Examination Review of Systems   Vitals:    02/14/19 1527   BP: 122/80   Pulse: 60   Resp: 14     Body mass index is 25.31 kg/m .  Wt Readings from Last 3 Encounters:   02/14/19 184 lb (83.5 kg)   02/01/19 187 lb (84.8 kg)   12/02/15 182 lb (82.6 kg)     General Appearance:   Alert, cooperative and in no acute distress.   ENT/Mouth: Oral mucuos membranes pink and moist .      EYES:  No scleral icterus. No Xanthelasma.    Neck: JVP normal. No Hepatojugular reflux. Thyroid not visualized   Chest/Lungs:   Lungs are clear to auscultation, equal chest wall expansion    Cardiovascular:   S1, S2 without murmur ,clicks or rubs. Brachial, radial and posterior tibial pulses are intact and symetric. No carotid bruits noted   Abdomen:  Nontender. BS+.      Extremities: No cyanosis, clubbing or edema   Skin: no xanthelasma, warm.    Psych: Appropriate affect.   Neurologic: normal gait, normal  bilateral, no tremors        General:  WNL  Eyes: WNL  Ears/Nose/Throat: WNL  Lungs: Snoring  Heart: Chest Pain, Irregular Heartbeat  Stomach: Abdominal Pain  Bladder: Frequent Urination at Night  Muscle/Joints: WNL  Skin: WNL  Nervous System: WNL  Mental Health: WNL     Blood: WNL     Medical History  Surgical History Family History Social History   Past Medical History:   Diagnosis Date   ? GERD (gastroesophageal reflux disease)     Past Surgical History:   Procedure Laterality Date   ? APPENDECTOMY  15yrs ago   ? HERNIA REPAIR      Family History   Problem Relation Age of Onset   ? Hyperlipidemia Mother    ? Hypertension Mother    ? Liver cancer Mother    ? Parkinsonism Mother    ? Hyperlipidemia Father    ? Hypertension Father    ? Heart disease Father    ? Breast cancer Sister         ~55   ? Hyperlipidemia Brother    ? Hypertension Brother    ? Pancreatic cancer Paternal Aunt    ? Prostate cancer Paternal Grandfather    ? Hyperlipidemia Sister    ? Hypertension Sister    ? Hyperlipidemia Brother    ? Hypertension Brother    ? Hyperlipidemia Brother    ? Hypertension Brother    ? No Medical Problems Brother    ? No Medical Problems Brother    ? Heart failure Paternal Aunt    ? Pancreatic cancer Paternal Aunt    ? No Medical Problems Daughter    ? No Medical Problems Son    ? No Medical Problems Son    ? Colon cancer Neg Hx    ? Drug abuse Neg Hx    ? Donna Parkinson White syndrome Neg Hx     Social History     Socioeconomic History   ? Marital status:      Spouse name: Not on file   ? Number of children: Not on file   ? Years of education: Not on file   ? Highest education level: Not on file   Social Needs   ? Financial resource strain: Not on file   ? Food insecurity - worry: Not on file   ? Food insecurity - inability: Not on file   ? Transportation needs - medical: Not on file   ? Transportation needs - non-medical: Not on file   Occupational History   ? Not on file   Tobacco Use   ? Smoking status: Never Smoker   ? Smokeless tobacco:  Never Used   Substance and Sexual Activity   ? Alcohol use: Yes     Alcohol/week: 12.6 oz     Types: 21 Shots of liquor per week     Drinks per session: 10 or more   ? Drug use: No   ? Sexual activity: Yes   Other Topics Concern   ? Not on file   Social History Narrative   ? Not on file          Medications  Allergies   Current Outpatient Medications   Medication Sig Dispense Refill   ? ibuprofen (ADVIL,MOTRIN) 200 MG tablet Take 800 mg by mouth every morning.     ? multivitamin with minerals (THERA-M) 9 mg iron-400 mcg Tab tablet Take 1 tablet by mouth daily.     ? omeprazole (PRILOSEC) 20 MG capsule Take 1 capsule (20 mg total) by mouth daily. 90 capsule 3     No current facility-administered medications for this visit.       Allergies   Allergen Reactions   ? Demerol [Meperidine]      Dizziness          Lab Results    Chemistry/lipid CBC Cardiac Enzymes/BNP/TSH/INR   Lab Results   Component Value Date    CHOL 200 (H) 02/01/2019    HDL 62 02/01/2019    LDLCALC 126 02/01/2019    TRIG 60 02/01/2019    CREATININE 0.99 02/01/2019    BUN 23 (H) 02/01/2019    K 4.6 02/01/2019     02/01/2019     02/01/2019    CO2 22 02/01/2019    Lab Results   Component Value Date    WBC 4.7 12/03/2015    HGB 15.1 12/03/2015    HCT 44.0 12/03/2015    MCV 87 12/03/2015     12/03/2015    Lab Results   Component Value Date    TSH 2.06 02/01/2019

## 2021-06-27 NOTE — PROGRESS NOTES
Progress Notes by Willam Mooney MD at 2/1/2019  8:20 AM     Author: Willam Mooney MD Service: -- Author Type: Physician    Filed: 2/1/2019 11:06 AM Encounter Date: 2/1/2019 Status: Signed    : Willam Mooney MD (Physician)       MALE PREVENTATIVE EXAM    Assessment and Plan:     Problem List Items Addressed This Visit     Family history of prostate cancer     Continue to follow PSA.  Consider Flomax in the future if objective symptoms of lower urinary tract worsen.         Relevant Orders    PSA (Prostatic-Specific Antigen), Annual Screen    Inguinal hernia of left side with obstruction and without gangrene     Symptoms are consistent with hernia.  His exam is fairly benign although there is slight palpable lump in the area.  Referral to general surgery discussed.         Elevated fasting glucose     Likely prediabetic.  Check hemoglobin A1c and fasting glucose.         Relevant Orders    Glycosylated Hemoglobin A1c (Completed)    Comprehensive Metabolic Panel    Hypercholesterolemia    Relevant Orders    Lipid Profile    Comprehensive Metabolic Panel    Routine general medical examination at a health care facility - Primary     Patient returns for annual exam.  We will check fasting lab work.  General surgery referral and cardiology referral as discussed elsewhere.  Patient has completed his colonoscopy.  Follow-up plan is to be determined based on labs and specialist referrals.         Relevant Orders    Lipid Profile    PSA (Prostatic-Specific Antigen), Annual Screen    Glycosylated Hemoglobin A1c (Completed)    Electrocardiogram Perform - Clinic (Completed)    Thyroid Stimulating Hormone (TSH)    Ambulatory referral to General Surgery    Comprehensive Metabolic Panel    Irregular heart beat     58-year-old with family history of heart disease presenting with intermittent sensation of fast heart rate.  There is some exertional chest pain associated with these episodes.  Most recently  "approximately 1 week ago.  He has had 3 or 4 episodes over the past 12 months.  He otherwise feels well.  He works in a physically demanding job and states that he can \"wrestle with a cow\" for an hour without symptoms in general.  -Abnormalities on his EKG that do not explain fast heart rate.  No ischemic changes however.  -We discussed the utility of some type of event monitor, stress test, both.  Given his overall story, it ultimately recommended that he discuss his symptoms with a cardiologist to determine additional diagnostic tests or monitoring tests.  -Pain recurs and does not resolve, the patient should present to the emergency department.         Relevant Orders    Electrocardiogram Perform - Clinic (Completed)    Magnesium    Ambulatory referral to Cardiology      Other Visit Diagnoses     Chest pain, unspecified type        Relevant Orders    Magnesium    Ambulatory referral to Cardiology    Bradycardia        Relevant Orders    Ambulatory referral to Cardiology        Next follow up:  Return in about 4 weeks (around 3/1/2019) for recheck if not improving.    Immunization Review  Adult Imm Review: No immunizations due today  Pt does not use tobacco products   I discussed the following with the patient:   Adult Healthy Living: Importance of regular exercise  Healthy nutrition  Use of seat belts    I have had an Advance Directives discussion with the patient.    Subjective:   Chief Complaint: Saurabh Morel is an 58 y.o. male here for a preventative health visit.     HPI:      Fast heart rate:   - intermittent.  First occurrence about a year ago.   - with activity.  Chest pain that resolves.  Pulse is racing and perhaps erratic.  5 minutes at a time.      - he does not snore.  He has been told that he stops breathing.  Energy: good but less than before.    - no changes in stamina   - he wondered about gas.  Belching makes it better.   - no ecg history    Hernia:   - left side.  \"Gas is getting built up.\"    " "- bulge   - \"goes back in.\"   - history of hernia repair on the right side   - duration: worse in the past month but present for 6+ months.   - no change in BM. No testicular pain.     - one episodes of blood on his penis.  \"a drip.\"   - decreased stream.      Healthy Habits  Are you taking a daily aspirin? No  Do you typically exercising at least 40 min, 3-4 times per week?  NO  Do you usually eat at least 4 servings of fruit and vegetables a day, include whole grains and fiber and avoid regularly eating high fat foods? Yes  Have you had an eye exam in the past two years? Yes   Do you see a dentist twice per year? Yes  Do you have any concerns regarding sleep? No    Safety Screen  If you own firearms, are they secured in a locked gun cabinet or with trigger locks? NO  Do you feel you are safe where you are living?: Yes (2/1/2019  8:27 AM)  Do you feel you are safe in your relationship(s)?: Yes (2/1/2019  8:27 AM)      Review of Systems:  Please see above.  The rest of the review of systems are negative for all systems.     Cancer Screening       Status Date      COLONOSCOPY Next Due 12/14/2020      Done 12/14/2015 COLONOSCOPY EXTERNAL RESULT          Patient Care Team:  Willam Mooney MD as PCP - General (Family Medicine)        History     Reviewed By Date/Time Sections Reviewed    Sandra Pruitt LPN 2/1/2019  8:24 AM Tobacco    Sandra Pruitt LPN 2/1/2019  8:21 AM Surgical, Tobacco, Alcohol, Drug Use, Sexual Activity, Family            Objective:   Vital Signs:   Visit Vitals  /68 (Patient Site: Left Arm, Patient Position: Sitting, Cuff Size: Adult Regular)   Pulse (!) 56   Temp 98.7  F (37.1  C) (Oral)   Resp 20   Ht 5' 11.5\" (1.816 m) Comment: with shoes   Wt 187 lb (84.8 kg)   SpO2 97% Comment: room air   BMI 25.72 kg/m      PHYSICAL EXAM  Physical Exam   Constitutional: He is oriented to person, place, and time. He appears well-developed. No distress.   HENT:   Head: " Normocephalic and atraumatic.   Right Ear: External ear normal.   Left Ear: External ear normal.   Nose: Nose normal.   Mouth/Throat: Oropharynx is clear and moist. No oropharyngeal exudate.   Eyes: Conjunctivae and EOM are normal. Pupils are equal, round, and reactive to light. Right eye exhibits no discharge. Left eye exhibits no discharge. No scleral icterus.   Neck: Normal range of motion. No thyromegaly present.   Cardiovascular: Normal rate, regular rhythm, normal heart sounds and intact distal pulses. Exam reveals no gallop and no friction rub.   No murmur heard.  Pulmonary/Chest: Effort normal and breath sounds normal. No respiratory distress. He has no wheezes.   Abdominal: Soft. He exhibits no distension and no mass. There is no tenderness. A hernia is present. Hernia confirmed positive in the right inguinal area.   Genitourinary: Testes normal and penis normal. Rectal exam shows no fissure, no mass and anal tone normal. Prostate is enlarged. Right testis shows no mass and no tenderness. Left testis shows no mass and no tenderness. Circumcised.   Musculoskeletal: Normal range of motion. He exhibits no edema.   Lymphadenopathy:     He has no cervical adenopathy.   Neurological: He is alert and oriented to person, place, and time. He has normal reflexes. No cranial nerve deficit. He exhibits normal muscle tone.   Skin: Skin is warm.   Psychiatric: He has a normal mood and affect. His behavior is normal. Judgment and thought content normal.   Vitals reviewed.    EKG: My personal interpretation-sinus bradycardia.  Right bundle branch block.  No comparison available.    The ASCVD Risk score ([place] JULIAN Jr., et al., 2013) failed to calculate for the following reasons:    Cannot find a previous HDL lab    Cannot find a previous total cholesterol lab       Medication List           Accurate as of 2/1/19 11:06 AM. If you have any questions, ask your nurse or doctor.               CONTINUE taking these medications     ibuprofen 200 MG tablet  Also known as:  ADVIL,MOTRIN  INSTRUCTIONS:  Take 800 mg by mouth every morning.        multivitamin with minerals 9 mg iron-400 mcg Tab tablet  Also known as:  THERA-M  INSTRUCTIONS:  Take 1 tablet by mouth daily.        omeprazole 20 MG capsule  Also known as:  PriLOSEC  INSTRUCTIONS:  Take 1 capsule (20 mg total) by mouth daily.               Additional Screenings Completed Today:

## 2021-06-27 NOTE — PROGRESS NOTES
"Progress Notes by Jimmy José MD at 4/4/2019  3:50 PM     Author: Jimmy José MD Service: -- Author Type: Physician    Filed: 4/4/2019  4:19 PM Encounter Date: 4/4/2019 Status: Signed    : Jimmy José MD (Physician)           Click to link to [facility] HEART CARE NOTE    Thank you, Dr.St Torres, for asking the [facility] Heart Care team to see Mr. Saurabh Morel to evaluate treatment options for PSVT.      Assessment/Recommendations   Assessment:      PSVT: The patient gives a approximate 1 year history for recurrent tachypalpitations.  The description of his episodes is consistent with a reentrant mechanism.  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  Sinus rhythm EKG shows no evidence of delta wave which would indicate potentially a \"concealed\" pathway.  The patient has been recently started on beta-blocker therapy and is tolerated this reasonably well with no significant breakthrough at this point.  I have indicated to him that the rhythm is not life-threatening and that he certainly can take medication for this problem, but we would expect him to continue to have periodic episodes of recurrent SVT.    Inguinal hernia: The patient's workup for inguinal hernia repair was stopped due to his cardiac symptoms.  The above problem does not put him at increased risk for surgical intervention.  He might need adenosine to terminate an episode intraoperatively or postoperatively but this can easily be handled by anesthesia.  The patient is cleared for his surgery from a cardiac perspective.    Plan:    Patient will remain on beta-blocker therapy at this point.    I have asked him to follow-up with me in 6 months.  If his symptoms progress or he wants to talk about scheduling the ablation he was given the EP nurse clinician phone " "line to contact them and move forward.                  History of Present Illness/Subjective    Mr. Saurabh Morel is a 58 y.o. male with history of tachypalpitations dating back approximately 1 year.  Episodes typically occur in the setting of some physical activity and he notes the abrupt onset of palpitations and chest pain syndrome.  He does not have much in the way of lightheadedness or shortness of breath.  He has had no presyncope or syncope.  The patient's symptoms occurred during his recent stress test but he describes these as relatively \"mild\".  The patient was placed on beta-blocker and his heart rates have slowed and he said no recurrent episodes since the medication was initiated.  When he is in normal rhythm he reports no exertional chest pain or pressure.  He is not experiencing any orthopnea, PND or ankle edema.    ECG: ECG dated February 1, 2019 is reviewed.  The tracing shows normal sinus rhythm.  The RI interval, QRS duration and QT intervals were all normal.  There is no evidence of preexcitation.  Personally reviewed.     ECHO: Stress echocardiogram dated March 1, 2019  Indications     Irregular heart beat   Precordial pain   Interpretation Summary       1.The patient's exercise tolerance was normal.    2.The stress electrocardiogram is negative for inducible ischemic EKG changes after 7 minutes and 15 seconds on the standard Carlos protocol.    3.Left ventricle ejection fraction is normal. The estimated left ventricular ejection fraction is 60%.    4.Screening echocardiogram showed no obvious abnormality.    5.Stress echocardiogram is equivocal for inducible ischemia secondary to the fact that post exercise images were of limited quality given the patient's sudden appearance of SVT. Based on limited views available does not appear to be any ischemia.    6.Approximately 54 seconds in recovery patient developed a narrow complex SVT at 245 beats a minute, this lasted for approximately 3 minutes, " it resembles an AVNRT that broke with coughing and Valsalva.    7.No previous study for comparison.         Other Studies:        Physical Examination Review of Systems   Vitals:    04/04/19 1520   BP: 110/68   Pulse: (!) 58   Resp: 16     Body mass index is 25.86 kg/m .  Wt Readings from Last 3 Encounters:   04/04/19 188 lb (85.3 kg)   02/15/19 188 lb 6.4 oz (85.5 kg)   02/14/19 184 lb (83.5 kg)     [unfilled]    General     Appearance:   The patient is alert oriented to person place and situation.    The patient is in no acute distress at the time of my evaluation.   HEENT:  Pupils are equal, round, and reactive to light.  Conjunctiva and sclera are clear.  ENT: Oral mucosa is moist and without redness. No evident nasal discharge.   Neck: Without palpable thyroid or appreciable lymph nodes.   Chest: Symmetric, without deformity.   Lungs:   Clear bilaterally with no rales, rhonchi, or wheezes.     Cardiovascular:   Rhythm is regular. S1 and S2 are normal. No significant murmur is present. JVP is normal. Lower extremities demonstrate no significant edema. Distal pulses are intact bilaterally.   Abdomen:  Abdomen is flat, soft, and nontender.   Extremities: Without deformity.   Skin: Skin is warm, dry, and otherwise intact.   Neurologic: Gait is normal.           A 12 point comprehensive review of systems was  negative except as noted.      Medical History  Surgical History Family History Social History   Past Medical History:   Diagnosis Date   ? Elevated fasting glucose 2/1/2019   ? GERD (gastroesophageal reflux disease)    ? Hypercholesterolemia 2/1/2019   ? PSVT (paroxysmal supraventricular tachycardia) (H) 2/1/2019    Documented: March 1, 2019 post GXT Narrow QRS SVT:  ms,  ms (c/w ORT)    Past Surgical History:   Procedure Laterality Date   ? APPENDECTOMY  15yrs ago   ? CATARACT EXTRACTION EXTRACAPSULAR W/ INTRAOCULAR LENS IMPLANTATION Right    ? HERNIA REPAIR Right 2014    Family History   Problem  Relation Age of Onset   ? Hyperlipidemia Mother    ? Hypertension Mother    ? Liver cancer Mother    ? Parkinsonism Mother    ? Hyperlipidemia Father    ? Hypertension Father    ? Heart disease Father    ? Breast cancer Sister         ~55   ? Hyperlipidemia Brother    ? Hypertension Brother    ? Pancreatic cancer Paternal Aunt    ? Prostate cancer Paternal Grandfather    ? Hyperlipidemia Sister    ? Hypertension Sister    ? Hyperlipidemia Brother    ? Hypertension Brother    ? Other Brother 24        MVA   ? Hypertension Brother    ? Heart failure Paternal Aunt    ? Pancreatic cancer Paternal Aunt    ? No Medical Problems Daughter    ? No Medical Problems Son    ? No Medical Problems Son    ? Colon cancer Neg Hx    ? Drug abuse Neg Hx    ? Donna Parkinson White syndrome Neg Hx     Social History     Socioeconomic History   ? Marital status:      Spouse name: Not on file   ? Number of children: 3   ? Years of education: Not on file   ? Highest education level: Not on file   Occupational History   ? 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 Financial resource strain: Not on file   ? Food insecurity:     Worry: Not on file     Inability: Not on file   ? Transportation needs:     Medical: Not on file     Non-medical: Not on file   Tobacco Use   ? Smoking status: Never Smoker   ? Smokeless tobacco: Never Used   Substance and Sexual Activity   ? Alcohol use: Yes     Alcohol/week: 12.6 oz     Types: 21 Shots of liquor per week     Drinks per session: 10 or more   ? Drug use: No   ? Sexual activity: Yes     Partners: Female   Lifestyle   ? Physical activity:     Days per week: Not on file     Minutes per session: Not on file   ? Stress: Not on file   Relationships   ? Social connections:     Talks on phone: Not on file     Gets together: Not on file     Attends [employer]ism service: Not on file     Active member of club or organization: Not on file     Attends meetings of clubs  or organizations: Not on file     Relationship status: Not on file   ? Intimate partner violence:     Fear of current or ex partner: Not on file     Emotionally abused: Not on file     Physically abused: Not on file     Forced sexual activity: Not on file   Other Topics Concern   ? Not on file   Social History Narrative     to [place] and live alone. 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          Medications  Allergies   Scheduled Meds:  Current Outpatient Medications   Medication Sig Dispense Refill   ? ibuprofen (ADVIL,MOTRIN) 200 MG tablet Take 800 mg by mouth every morning.     ? metoprolol tartrate (LOPRESSOR) 50 MG tablet Take 1 tablet by mouth daily.     ? multivitamin with minerals (THERA-M) 9 mg iron-400 mcg Tab tablet Take 1 tablet by mouth daily.     ? omeprazole (PRILOSEC) 20 MG capsule Take 1 capsule (20 mg total) by mouth daily. 90 capsule 3     No current facility-administered medications for this visit.     Allergies   Allergen Reactions   ? Demerol [Meperidine]      Dizziness          Lab Results    Chemistry/lipid CBC Cardiac Enzymes/BNP/TSH/INR   Lab Results   Component Value Date    CHOL 200 (H) 02/01/2019    HDL 62 02/01/2019    LDLCALC 126 02/01/2019    TRIG 60 02/01/2019    CREATININE 0.99 02/01/2019    BUN 23 (H) 02/01/2019    K 4.6 02/01/2019     02/01/2019     02/01/2019    CO2 22 02/01/2019    Lab Results   Component Value Date    WBC 4.7 12/03/2015    HGB 15.1 12/03/2015    HCT 44.0 12/03/2015    MCV 87 12/03/2015     12/03/2015    Lab Results   Component Value Date    TSH 2.06 02/01/2019

## 2021-07-04 NOTE — TELEPHONE ENCOUNTER
Telephone Encounter by Sujey Christensen at 6/7/2021 12:30 PM     Author: Sujey Christensen Service: -- Author Type: --    Filed: 6/7/2021 12:31 PM Encounter Date: 6/3/2021 Status: Signed    : Sujey Christensen APPROVED:    Approval start date: 06/04/2021  Approval end date:  06/03/2024    Pharmacy has been notified of approval and will contact patient when medication is ready for pickup.

## 2021-07-04 NOTE — CONFIDENTIAL NOTE
Confidential Note by Jimmy José MD at 1/28/2021 10:50 AM     Author: Jimmy José MD Service: -- Author Type: Physician    Filed: 1/28/2021 12:11 PM Encounter Date: 1/28/2021 Status: Signed    : Jimmy José MD (Physician)         [facility]  Cardiac Electrophysiology     Progress Note: Jimmy José    Primary Care: Willam Telles MD    Assessment:         PSVT: Chronic problem, stable.  The patient has a very low burden of symptoms.  He is unaware of any sustained episodes of tachycardia since being started on beta-blocker therapy.  He is tolerating medication well.  The patient is aware that his arrhythmia likely will recur in the future and that he has additional options besides medical therapy including mapping and ablation.    Family history of coronary atherosclerosis: The patient's had a previous negative stress test however we could further refine his risk status by virtue of a coronary calcium score.  I will discuss this with him at his next scheduled visit.    Recommendations:    No change in current medications    Follow-up in the arrhythmia clinic with me in 1 year or sooner if his condition dictates.    Subjective:  Saurabh Morel (60 y.o. male) returns to the arrhythmia clinic for interval reevaluation of his PSVT.  The patient reports no symptoms of his PSVT which used to include a chest heaviness sensation and abnormal beating in the chest as well.  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  He reports no exertional chest pressure or pain.  He is not experiencing any orthopnea, PND or ankle edema.  He is tolerating his medication well.    Current Outpatient Medications   Medication Sig Dispense Refill   ? ibuprofen (ADVIL,MOTRIN) 200 MG tablet Take 800 mg by mouth every morning.     ? metoprolol succinate (TOPROL-XL) 50 MG 24 hr tablet TAKE 1/2 TABLET(25MG) BY MOUTH DAILY 45 tablet 1   ? multivitamin  "with minerals (THERA-M) 9 mg iron-400 mcg Tab tablet Take 1 tablet by mouth daily.     ? omeprazole (PRILOSEC) 20 MG capsule TAKE 1 CAPSULE(20 MG) BY MOUTH DAILY 90 capsule 3     No current facility-administered medications for this visit.        Review of Systems:   General: WNL  Eyes: WNL  Ears/Nose/Throat: WNL  Lungs: Snoring  Heart: WNL  Stomach: WNL  Bladder: Frequent Urination at Night  Muscle/Joints: Joint Pain  Skin: WNL  Nervous System: WNL  Mental Health: WNL     Blood: WNL    Family History  Family History   Problem Relation Age of Onset   ? Hyperlipidemia Mother    ? Hypertension Mother    ? Liver cancer Mother    ? Parkinsonism Mother    ? Hyperlipidemia Father    ? Hypertension Father    ? Heart disease Father 94   ? CABG Father    ? Breast cancer Sister         ~55   ? Hyperlipidemia Brother    ? Hypertension Brother    ? Pancreatic cancer Paternal Aunt    ? Prostate cancer Paternal Grandfather    ? Hyperlipidemia Sister    ? Hypertension Sister    ? Hyperlipidemia Brother    ? Hypertension Brother    ? Esophageal cancer Brother 54   ? Other Brother 24        MVA   ? Hypertension Brother    ? Heart failure Paternal Aunt    ? Pancreatic cancer Paternal Aunt    ? No Medical Problems Daughter    ? No Medical Problems Son    ? No Medical Problems Son    ? Colon cancer Neg Hx    ? Drug abuse Neg Hx    ? Donna Parkinson White syndrome Neg Hx        Social History   reports that he has never smoked. He has never used smokeless tobacco. He reports current alcohol use of about 16.0 standard drinks of alcohol per week. He reports that he does not use drugs.    Objective:   Vital signs in last 24 hours:  /80 (Patient Site: Left Arm, Patient Position: Sitting, Cuff Size: Adult Regular)   Pulse (!) 50   Resp 16   Ht 5' 10.5\" (1.791 m)   Wt 192 lb (87.1 kg)   BMI 27.16 kg/m    Weight: Weight: 192 lb (87.1 kg)     Physical Exam:  General: The patient is alert oriented to person place and situation.  The " patient is in no acute distress at the time of my evaluation.  Eyes: Pupils are equal, round, and reactive to light.  Conjunctiva and sclera are clear.  ENT: Oral mucosa is moist and without redness. No evident nasal discharge.  Pulmonary: Lungs are clear bilaterally with no rales, rhonchi, or wheezes.    Cardiovascular exam: Rhythm is regular. S1 and S2 are normal. No significant murmur is present. JVP is normal. Lower extremities demonstrate no significant edema. Distal pulses are intact bilaterally.  Abdomen is flat, soft, and nontender.  Musculoskeletal: Spine is straight. Extremities without deformity.  Neuro: Gait is normal.   Skin is warm, dry, and otherwise intact.      Cardiographics:       Lab Results:   Lab Results   Component Value Date     02/01/2019    K 4.6 02/01/2019     02/01/2019    CO2 22 02/01/2019    BUN 23 (H) 02/01/2019    CREATININE 0.99 02/01/2019    CALCIUM 9.7 02/01/2019     Lab Results   Component Value Date    WBC 4.7 12/03/2015    WBC 3.8 (L) 12/26/2014    HGB 15.1 12/03/2015    HCT 44.0 12/03/2015    MCV 87 12/03/2015     12/03/2015     No results found for: INR      Outside record review:

## 2021-07-07 ENCOUNTER — RECORDS - HEALTHEAST (OUTPATIENT)
Dept: ADMINISTRATIVE | Facility: OTHER | Age: 61
End: 2021-07-07

## 2021-09-01 ENCOUNTER — TRANSFERRED RECORDS (OUTPATIENT)
Dept: HEALTH INFORMATION MANAGEMENT | Facility: CLINIC | Age: 61
End: 2021-09-01

## 2021-09-02 DIAGNOSIS — K21.9 GASTROESOPHAGEAL REFLUX DISEASE WITHOUT ESOPHAGITIS: Primary | ICD-10-CM

## 2021-09-03 NOTE — TELEPHONE ENCOUNTER
"  Disp Refills Start End SUSAN    omeprazole (PRILOSEC) 20 MG capsule 90 capsule 3 8/12/2020  No   Sig: TAKE 1 CAPSULE(20 MG) BY MOUTH DAILY   Sent to pharmacy as: omeprazole 20 mg capsule,delayed release (PriLOSEC)   E-Prescribing Status: Receipt confirmed by pharmacy (8/12/2020  6:17 AM CDT)   omeprazole (PRILOSEC) 20 MG capsule [[id number]]    Electronically signed by: Willam Mooney MD on 08/12/20 0617 Status: Active   Ordering user: Willam Mooney MD 08/12/20 0617 Authorized by: Willam Mooney MD   Frequency:  08/12/20 - Until Discontinued Released by: Willam Mooney MD 08/12/20 0617   Diagnoses  Gastroesophageal reflux disease without esophagitis [K21.9]     Former patient of Tyron & has not established care with another provider.  Please assign refill request to covering provider per clinic standard process.    Routing refill request to provider for review/approval because:  Patient needs to be seen because it has been more than 1 year since last office visit.  No pcp    Last Written Prescription Date:  8/12/20  Last Fill Quantity: 90,  # refills: 3   Last office visit provider:  2/1/19     Requested Prescriptions   Pending Prescriptions Disp Refills     omeprazole (PRILOSEC) 20 MG DR capsule [Pharmacy Med Name: OMEPRAZOLE 20MG CAPSULES] 90 capsule      Sig: TAKE 1 CAPSULE(20 MG) BY MOUTH DAILY       PPI Protocol Failed - 9/2/2021 11:17 AM        Failed - Recent (12 mo) or future (30 days) visit within the authorizing provider's specialty     Patient has had an office visit with the authorizing provider or a provider within the authorizing providers department within the previous 12 mos or has a future within next 30 days. See \"Patient Info\" tab in inbasket, or \"Choose Columns\" in Meds & Orders section of the refill encounter.              Failed - Medication is active on med list        Passed - Not on Clopidogrel (unless Pantoprazole ordered)        Passed - No diagnosis of osteoporosis on " record        Passed - Patient is age 18 or older             Blake Fox RN 09/03/21 8:45 AM

## 2021-09-20 DIAGNOSIS — I47.10 PSVT (PAROXYSMAL SUPRAVENTRICULAR TACHYCARDIA) (H): Primary | ICD-10-CM

## 2021-09-20 RX ORDER — METOPROLOL TARTRATE 25 MG/1
25 TABLET, FILM COATED ORAL DAILY
Qty: 90 TABLET | Refills: 3 | Status: SHIPPED | OUTPATIENT
Start: 2021-09-20 | End: 2021-09-20 | Stop reason: ALTCHOICE

## 2021-09-20 RX ORDER — METOPROLOL SUCCINATE 25 MG/1
25 TABLET, EXTENDED RELEASE ORAL DAILY
Qty: 90 TABLET | Refills: 3 | Status: SHIPPED | OUTPATIENT
Start: 2021-09-20 | End: 2022-09-19

## 2022-08-24 DIAGNOSIS — K21.9 GASTROESOPHAGEAL REFLUX DISEASE WITHOUT ESOPHAGITIS: ICD-10-CM

## 2022-08-26 NOTE — TELEPHONE ENCOUNTER
"Routing refill request to provider for review/approval because:  Patient needs to be seen because it has been more than 3 year since last office visit.    Last Written Prescription Date:  9/3/2021  Last Fill Quantity: 90,  # refills: 3   Last office visit provider:  2/1/2019     Requested Prescriptions   Pending Prescriptions Disp Refills     omeprazole (PRILOSEC) 20 MG DR capsule [Pharmacy Med Name: OMEPRAZOLE 20MG CAPSULES] 90 capsule 3     Sig: TAKE 1 CAPSULE(20 MG) BY MOUTH DAILY       PPI Protocol Failed - 8/26/2022 11:08 AM        Failed - Recent (12 mo) or future (30 days) visit within the authorizing provider's specialty     Patient has had an office visit with the authorizing provider or a provider within the authorizing providers department within the previous 12 mos or has a future within next 30 days. See \"Patient Info\" tab in inbasket, or \"Choose Columns\" in Meds & Orders section of the refill encounter.              Passed - Not on Clopidogrel (unless Pantoprazole ordered)        Passed - No diagnosis of osteoporosis on record        Passed - Medication is active on med list        Passed - Patient is age 18 or older             Mary Ignacio RN 08/26/22 11:08 AM  "

## 2022-08-30 ENCOUNTER — OFFICE VISIT (OUTPATIENT)
Dept: CARDIOLOGY | Facility: CLINIC | Age: 62
End: 2022-08-30

## 2022-08-30 VITALS
RESPIRATION RATE: 16 BRPM | WEIGHT: 190 LBS | SYSTOLIC BLOOD PRESSURE: 119 MMHG | DIASTOLIC BLOOD PRESSURE: 74 MMHG | HEART RATE: 50 BPM | HEIGHT: 71 IN | BODY MASS INDEX: 26.6 KG/M2

## 2022-08-30 DIAGNOSIS — E78.5 DYSLIPIDEMIA: ICD-10-CM

## 2022-08-30 DIAGNOSIS — I47.10 SUPRAVENTRICULAR TACHYCARDIA (H): Primary | ICD-10-CM

## 2022-08-30 DIAGNOSIS — Z82.49 FAMILY HISTORY OF CORONARY ARTERIOSCLEROSIS: ICD-10-CM

## 2022-08-30 PROCEDURE — 99214 OFFICE O/P EST MOD 30 MIN: CPT | Performed by: INTERNAL MEDICINE

## 2022-08-30 RX ORDER — IBUPROFEN 200 MG
200-400 TABLET ORAL PRN
COMMUNITY

## 2022-08-30 NOTE — LETTER
"8/30/2022    Willam Mooney MD  [address] [facility] [address]    RE: Saurabh Morel       Dear Colleague,     I had the pleasure of seeing Saurabh Morel in the [facility].    [facility]  Cardiac Electrophysiology     Progress Note: Jimmy José MD    Primary Care: Willam Telles    Primary Cardiologist: Fabio Cat MD    Primary Electrophysiologist: Jimmy José MD    Assessment:         PSVT: Chronic problem with very low arrhythmia burden.  Patient has been on chronic low-dose metoprolol.  He currently reports occasional symptomatic ectopic beats but no sustained tachyarrhythmia.    Family history positive for ASCVD: The patient is previously been offered a coronary calcium score study, but the patient has not elected to move forward with that test.  Patient underwent stress echo evaluation in 2019 which demonstrated no evidence of ischemia.  Patient currently reports no symptoms suggesting coronary ischemia.  He is interested in pursuing coronary calcium scoring at this time.    Recommendations:    Patient be scheduled for coronary calcium score to better risk stratify him in regards to potential primary prevention therapeutic intervention.    Continue with metoprolol therapy.    Follow-up in the arrhythmia clinic with the EP RAUL in 12 months.  The patient will contact our office in the interim if he has recurrent symptoms.    Time spent: 35 minutes spent on the date of the encounter doing chart review, history and exam, documentation and further activities as noted above.    Subjective:  Saurabh Morel (61 year old male) returns to the arrhythmia clinic for interval reevaluation of his PSVT.  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  The patient reports occasional short-lived episodes of \"skipped\" beats.  These are described in a typical fashion for ectopy and he " describes no sustained tachypalpitations.  He reports no syncope or lightheadedness.  He is not experiencing any exertional dyspnea or shortness of breath.  He reports no other interval change in his general health care status.    Current Outpatient Medications   Medication Sig Dispense Refill     metoprolol succinate ER (TOPROL-XL) 25 MG 24 hr tablet Take 1 tablet (25 mg) by mouth daily 90 tablet 3     omeprazole (PRILOSEC OTC) 20 MG EC tablet Take 1 tablet (20 mg) by mouth daily       omeprazole (PRILOSEC) 20 MG DR capsule TAKE 1 CAPSULE(20 MG) BY MOUTH DAILY 90 capsule 3       Review of Systems:     Family History  Family History   Problem Relation Age of Onset     Liver Cancer Mother      Esophageal Cancer Brother      Hypertension Father      Hyperlipidemia Mother      Hypertension Mother      Parkinsonism Mother      Hyperlipidemia Father      Heart Disease Father 94.00     CABG Father      Breast Cancer Sister         ~55     Hyperlipidemia Brother      Hypertension Brother      Pancreatic Cancer Paternal Aunt      Prostate Cancer Paternal Grandfather      Hyperlipidemia Sister      Hypertension Sister      Hyperlipidemia Brother      Hypertension Brother      Esophageal Cancer Brother 54.00     Other - See Comments Brother 24.00        MVA     Hypertension Brother      Heart Failure Paternal Aunt      Pancreatic Cancer Paternal Aunt      No Known Problems Daughter      No Known Problems Son      No Known Problems Son      Colon Cancer No family hx of      Substance Abuse No family hx of      Donna Parkinson White syndrome No family hx of        Social History   reports that he has never smoked. He has never used smokeless tobacco. He reports current alcohol use of about 16.0 standard drinks of alcohol per week. He reports that he does not use drugs.    Objective:   Vital signs in last 24 hours:  /74 (BP Location: Left arm, Patient Position: Sitting, Cuff Size: Adult Regular)   Pulse 50   Resp 16    "Ht 1.803 m (5' 11\")   Wt 86.2 kg (190 lb)   BMI 26.50 kg/m    Physical Exam:  General: The patient is alert oriented to person place and situation.  The patient is in no acute distress at the time of my evaluation.  Eyes: Pupils are equal, round, and reactive to light.  Conjunctiva and sclera are clear.  ENT: Oral mucosa is moist and without redness. No evident nasal discharge.  Pulmonary: Lungs are clear bilaterally with no rales, rhonchi, or wheezes.    Cardiovascular exam: Rhythm is regular. S1 and S2 are normal. No significant murmur is present. JVP is normal. Lower extremities demonstrate no significant edema. Distal pulses are intact bilaterally.  Abdomen is soft, nontender, no organomegaly, and bowel sounds present.  Musculoskeletal: Spine is straight. Extremities without deformity.  Neuro: Gait is normal.   Skin is warm, dry, and otherwise intact.      Cardiographics:     Lab Results:       Outside record review:    Thank you for allowing me to participate in the care of your patient.      Sincerely,   Jimmy José MD   [facility]  cc: No referring provider defined for this encounter.  "

## 2022-08-30 NOTE — PROGRESS NOTES
"  [facility]  Cardiac Electrophysiology     Progress Note: Jimmy José MD    Primary Care: Willam Telles    Primary Cardiologist: Fabio Cat MD    Primary Electrophysiologist: Jimmy José MD    Assessment:         PSVT: Chronic problem with very low arrhythmia burden.  Patient has been on chronic low-dose metoprolol.  He currently reports occasional symptomatic ectopic beats but no sustained tachyarrhythmia.    Family history positive for ASCVD: The patient is previously been offered a coronary calcium score study, but the patient has not elected to move forward with that test.  Patient underwent stress echo evaluation in 2019 which demonstrated no evidence of ischemia.  Patient currently reports no symptoms suggesting coronary ischemia.  He is interested in pursuing coronary calcium scoring at this time.    Recommendations:    Patient be scheduled for coronary calcium score to better risk stratify him in regards to potential primary prevention therapeutic intervention.    Continue with metoprolol therapy.    Follow-up in the arrhythmia clinic with the EP RAUL in 12 months.  The patient will contact our office in the interim if he has recurrent symptoms.    Time spent: 35 minutes spent on the date of the encounter doing chart review, history and exam, documentation and further activities as noted above.    Subjective:  Saurabh Morel (61 year old male) returns to the arrhythmia clinic for interval reevaluation of his PSVT.  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  The patient reports occasional short-lived episodes of \"skipped\" beats.  These are described in a typical fashion for ectopy and he describes no sustained tachypalpitations.  He reports no syncope or lightheadedness.  He is not experiencing any exertional dyspnea or shortness of breath.  He reports no other interval change in his general " "health care status.    Current Outpatient Medications   Medication Sig Dispense Refill     metoprolol succinate ER (TOPROL-XL) 25 MG 24 hr tablet Take 1 tablet (25 mg) by mouth daily 90 tablet 3     omeprazole (PRILOSEC OTC) 20 MG EC tablet Take 1 tablet (20 mg) by mouth daily       omeprazole (PRILOSEC) 20 MG DR capsule TAKE 1 CAPSULE(20 MG) BY MOUTH DAILY 90 capsule 3       Review of Systems:     Family History  Family History   Problem Relation Age of Onset     Liver Cancer Mother      Esophageal Cancer Brother      Hypertension Father      Hyperlipidemia Mother      Hypertension Mother      Parkinsonism Mother      Hyperlipidemia Father      Heart Disease Father 94.00     CABG Father      Breast Cancer Sister         ~55     Hyperlipidemia Brother      Hypertension Brother      Pancreatic Cancer Paternal Aunt      Prostate Cancer Paternal Grandfather      Hyperlipidemia Sister      Hypertension Sister      Hyperlipidemia Brother      Hypertension Brother      Esophageal Cancer Brother 54.00     Other - See Comments Brother 24.00        MVA     Hypertension Brother      Heart Failure Paternal Aunt      Pancreatic Cancer Paternal Aunt      No Known Problems Daughter      No Known Problems Son      No Known Problems Son      Colon Cancer No family hx of      Substance Abuse No family hx of      Donna Parkinson White syndrome No family hx of        Social History   reports that he has never smoked. He has never used smokeless tobacco. He reports current alcohol use of about 16.0 standard drinks of alcohol per week. He reports that he does not use drugs.    Objective:   Vital signs in last 24 hours:  /74 (BP Location: Left arm, Patient Position: Sitting, Cuff Size: Adult Regular)   Pulse 50   Resp 16   Ht 1.803 m (5' 11\")   Wt 86.2 kg (190 lb)   BMI 26.50 kg/m    Physical Exam:  General: The patient is alert oriented to person place and situation.  The patient is in no acute distress at the time of my " evaluation.  Eyes: Pupils are equal, round, and reactive to light.  Conjunctiva and sclera are clear.  ENT: Oral mucosa is moist and without redness. No evident nasal discharge.  Pulmonary: Lungs are clear bilaterally with no rales, rhonchi, or wheezes.    Cardiovascular exam: Rhythm is regular. S1 and S2 are normal. No significant murmur is present. JVP is normal. Lower extremities demonstrate no significant edema. Distal pulses are intact bilaterally.  Abdomen is soft, nontender, no organomegaly, and bowel sounds present.  Musculoskeletal: Spine is straight. Extremities without deformity.  Neuro: Gait is normal.   Skin is warm, dry, and otherwise intact.      Cardiographics:       Lab Results:         Outside record review:

## 2022-09-13 ENCOUNTER — HOSPITAL ENCOUNTER (OUTPATIENT)
Dept: CT IMAGING | Facility: CLINIC | Age: 62
Discharge: HOME | End: 2022-09-13
Attending: INTERNAL MEDICINE | Admitting: INTERNAL MEDICINE
Payer: COMMERCIAL

## 2022-09-13 DIAGNOSIS — E78.5 DYSLIPIDEMIA: ICD-10-CM

## 2022-09-13 DIAGNOSIS — Z82.49 FAMILY HISTORY OF CORONARY ARTERIOSCLEROSIS: ICD-10-CM

## 2022-09-13 LAB
CV CALCIUM SCORE AGATSTON LM: 0
CV CALCIUM SCORING AGATSON LAD: 0
CV CALCIUM SCORING AGATSTON CX: 0
CV CALCIUM SCORING AGATSTON RCA: 0
CV CALCIUM SCORING AGATSTON TOTAL: 0

## 2022-09-13 PROCEDURE — 75571 CT HRT W/O DYE W/CA TEST: CPT | Mod: 26 | Performed by: INTERNAL MEDICINE

## 2022-09-13 PROCEDURE — 75571 CT HRT W/O DYE W/CA TEST: CPT

## 2022-09-18 DIAGNOSIS — I47.10 PSVT (PAROXYSMAL SUPRAVENTRICULAR TACHYCARDIA) (H): ICD-10-CM

## 2022-09-19 RX ORDER — METOPROLOL SUCCINATE 25 MG/1
TABLET, EXTENDED RELEASE ORAL
Qty: 90 TABLET | Refills: 3 | Status: SHIPPED | OUTPATIENT
Start: 2022-09-19 | End: 2023-09-15

## 2022-11-21 ENCOUNTER — HEALTH MAINTENANCE LETTER (OUTPATIENT)
Age: 62
End: 2022-11-21

## 2023-09-15 DIAGNOSIS — I47.10 PSVT (PAROXYSMAL SUPRAVENTRICULAR TACHYCARDIA) (H): ICD-10-CM

## 2023-09-15 RX ORDER — METOPROLOL SUCCINATE 25 MG/1
TABLET, EXTENDED RELEASE ORAL
Qty: 90 TABLET | Refills: 0 | Status: SHIPPED | OUTPATIENT
Start: 2023-09-15 | End: 2024-01-16

## 2023-09-28 ENCOUNTER — OFFICE VISIT (OUTPATIENT)
Dept: CARDIOLOGY | Facility: CLINIC | Age: 63
End: 2023-09-28
Payer: COMMERCIAL

## 2023-09-28 VITALS
SYSTOLIC BLOOD PRESSURE: 116 MMHG | HEART RATE: 68 BPM | WEIGHT: 197 LBS | RESPIRATION RATE: 16 BRPM | DIASTOLIC BLOOD PRESSURE: 70 MMHG | BODY MASS INDEX: 27.58 KG/M2 | HEIGHT: 71 IN

## 2023-09-28 DIAGNOSIS — R07.9 EXERTIONAL CHEST PAIN: ICD-10-CM

## 2023-09-28 DIAGNOSIS — I47.10 PSVT (PAROXYSMAL SUPRAVENTRICULAR TACHYCARDIA) (H): Primary | ICD-10-CM

## 2023-09-28 PROCEDURE — 99215 OFFICE O/P EST HI 40 MIN: CPT | Performed by: INTERNAL MEDICINE

## 2023-09-28 RX ORDER — MULTIVITAMIN,THERAPEUTIC
1 TABLET ORAL DAILY
COMMUNITY

## 2023-09-28 NOTE — LETTER
9/28/2023    Willam Mooney MD  [address]  [facility] [address]    RE: Saurabh Morel       Dear Colleague,     I had the pleasure of seeing Saurabh Morel in the [facility].    [facility]  Cardiac Electrophysiology     Progress Note: Jimmy José MD    Primary Care: Willam Mooney MD    Primary Cardiologist: Fabio Cat MD    Primary Electrophysiologist: Jimmy José MD    Assessment:         PSVT: Chronic problem with very low arrhythmia burden.  Patient continues to tolerate low-dose metoprolol.  He currently reports occasional symptomatic ectopic beats but no sustained tachyarrhythmia.  Patient is aware that he is a potential candidate for mapping/ablation of his arrhythmia but at this time wishes to continue with medical therapy    Family history positive for ASCVD: The patient underwent coronary calcium scoring 1 year ago.  The patient's score was 0.  Nonetheless, the patient gives a history of exertional midsternal chest tightness which resolves with rest.  The patient has no similar symptoms at rest or while sleeping.  The patient has not had any recent stress imaging studies and he has not had any therapy for his dyslipidemia.  Although less likely it is possible that he could have a soft plaque with no calcium present.  As such I recommended that the patient undergo stress echo assessment to rule out coronary ischemia.      Recommendations:  Encourage patient to obtain follow-up lipid assessment through his primary care physician and ensure target LDL <100 is achieved.  Referred for stress echo to rule out coronary ischemia.  I have asked the patient to remain on his metoprolol which will hopefully prevent him from developing his PSVT and permit adequate echo imaging.  Follow-up in the arrhythmia clinic with the EP RAUL in 1 year.    Time spent: 40 minutes spent on the date of the encounter doing chart review, history and exam, documentation and further  activities as noted above.    Subjective:  Saurabh Morel (62 year old male) returns to the arrhythmia clinic for interval reevaluation of his PSVT and dyslipidemia.  The patient reports occasional ectopic beats but no sustained tachypalpitations.  He does note that if he has forgotten his metoprolol dose is heart rates tend to be much more brisk but certainly not as rapid as his previous episodes of tachypalpitations.  The patient does report that [place] where he developed midsternal chest tightness.  The patient notes that when he slows down or stops this resolves quickly.  He is not having any similar symptoms at rest or awaken him from sleep.  He notes no orthopnea, PND or ankle edema.    Current Outpatient Medications   Medication Sig Dispense Refill    ibuprofen (ADVIL/MOTRIN) 200 MG tablet Take 200-400 mg by mouth      metoprolol succinate ER (TOPROL XL) 25 MG 24 hr tablet TAKE 1 TABLET(25 MG) BY MOUTH DAILY 90 tablet 0    omeprazole (PRILOSEC OTC) 20 MG EC tablet Take 1 tablet (20 mg) by mouth daily (Patient not taking: Reported on 8/30/2022)      omeprazole (PRILOSEC) 20 MG DR capsule TAKE 1 CAPSULE(20 MG) BY MOUTH DAILY 30 capsule 0       Review of Systems:     Family History  Family History   Problem Relation Age of Onset    Liver Cancer Mother     Esophageal Cancer Brother     Hypertension Father     Hyperlipidemia Mother     Hypertension Mother     Parkinsonism Mother     Hyperlipidemia Father     Heart Disease Father 94.00    CABG Father     Breast Cancer Sister         ~55    Hyperlipidemia Brother     Hypertension Brother     Pancreatic Cancer Paternal Aunt     Prostate Cancer Paternal Grandfather     Hyperlipidemia Sister     Hypertension Sister     Hyperlipidemia Brother     Hypertension Brother     Esophageal Cancer Brother 54.00    Other - See Comments Brother 24.00        MVA    Hypertension Brother     Heart Failure Paternal Aunt      "Pancreatic Cancer Paternal Aunt     No Known Problems Daughter     No Known Problems Son     No Known Problems Son     Colon Cancer No family hx of     Substance Abuse No family hx of     Donna Parkinson White syndrome No family hx of        Social History   reports that he has never smoked. He has never used smokeless tobacco. He reports current alcohol use of about 16.0 standard drinks of alcohol per week. He reports that he does not use drugs.    Objective:   Vital signs in last 24 hours:  /70 (BP Location: Right arm, Patient Position: Sitting, Cuff Size: Adult Large)   Pulse 68   Resp 16   Ht 1.803 m (5' 11\")   Wt 89.4 kg (197 lb)   BMI 27.48 kg/m      Physical Exam:  General: The patient is alert oriented to person place and situation.  The patient is in no acute distress at the time of my evaluation.  Eyes: Pupils are equal, round, and reactive to light.  Conjunctiva and sclera are clear.  ENT: Oral mucosa is moist and without redness. No evident nasal discharge.  Pulmonary: Lungs are clear bilaterally with no rales, rhonchi, or wheezes.    Cardiovascular exam: Rhythm is regular. S1 and S2 are normal. No significant murmur is present. JVP is normal. Lower extremities demonstrate no significant edema. Distal pulses are intact bilaterally.  Abdomen is soft, nontender, no organomegaly, and bowel sounds present.  Musculoskeletal: Spine is straight. Extremities without deformity.  Neuro: Gait is normal.   Skin is warm, dry, and otherwise intact.      Cardiographics:       Lab Results:         Outside record review:          Thank you for allowing me to participate in the care of your patient.      Sincerely,     Jimmy José MD     [facility]  cc:   Jimmy José MD  [address] [facility] [address]  [place] [address]      "

## 2023-09-28 NOTE — PROGRESS NOTES
[facility]  Cardiac Electrophysiology     Progress Note: Jimmy José MD    Primary Care: Willam Mooney MD    Primary Cardiologist: Fabio Cat MD    Primary Electrophysiologist: Jimmy José MD    Assessment:         PSVT: Chronic problem with very low arrhythmia burden.  Patient continues to tolerate low-dose metoprolol.  He currently reports occasional symptomatic ectopic beats but no sustained tachyarrhythmia.  Patient is aware that he is a potential candidate for mapping/ablation of his arrhythmia but at this time wishes to continue with medical therapy    Family history positive for ASCVD: The patient underwent coronary calcium scoring 1 year ago.  The patient's score was 0.  Nonetheless, the patient gives a history of exertional midsternal chest tightness which resolves with rest.  The patient has no similar symptoms at rest or while sleeping.  The patient has not had any recent stress imaging studies and he has not had any therapy for his dyslipidemia.  Although less likely it is possible that he could have a soft plaque with no calcium present.  As such I recommended that the patient undergo stress echo assessment to rule out coronary ischemia.      Recommendations:  Encourage patient to obtain follow-up lipid assessment through his primary care physician and ensure target LDL <100 is achieved.  Referred for stress echo to rule out coronary ischemia.  I have asked the patient to remain on his metoprolol which will hopefully prevent him from developing his PSVT and permit adequate echo imaging.  Follow-up in the arrhythmia clinic with the EP RAUL in 1 year.    Time spent: 40 minutes spent on the date of the encounter doing chart review, history and exam, documentation and further activities as noted above.    Subjective:  Saurabh Morel (62 year old male) returns to the arrhythmia clinic for interval reevaluation of his PSVT and dyslipidemia.  The patient reports occasional ectopic  beats but no sustained tachypalpitations.  He does note that if he has forgotten his metoprolol dose is heart rates tend to be much more brisk but certainly not as rapid as his previous episodes of tachypalpitations.  The patient does report that [place] where he developed midsternal chest tightness.  The patient notes that when he slows down or stops this resolves quickly.  He is not having any similar symptoms at rest or awaken him from sleep.  He notes no orthopnea, PND or ankle edema.    Current Outpatient Medications   Medication Sig Dispense Refill    ibuprofen (ADVIL/MOTRIN) 200 MG tablet Take 200-400 mg by mouth      metoprolol succinate ER (TOPROL XL) 25 MG 24 hr tablet TAKE 1 TABLET(25 MG) BY MOUTH DAILY 90 tablet 0    omeprazole (PRILOSEC OTC) 20 MG EC tablet Take 1 tablet (20 mg) by mouth daily (Patient not taking: Reported on 8/30/2022)      omeprazole (PRILOSEC) 20 MG DR capsule TAKE 1 CAPSULE(20 MG) BY MOUTH DAILY 30 capsule 0       Review of Systems:     Family History  Family History   Problem Relation Age of Onset    Liver Cancer Mother     Esophageal Cancer Brother     Hypertension Father     Hyperlipidemia Mother     Hypertension Mother     Parkinsonism Mother     Hyperlipidemia Father     Heart Disease Father 94.00    CABG Father     Breast Cancer Sister         ~55    Hyperlipidemia Brother     Hypertension Brother     Pancreatic Cancer Paternal Aunt     Prostate Cancer Paternal Grandfather     Hyperlipidemia Sister     Hypertension Sister     Hyperlipidemia Brother     Hypertension Brother     Esophageal Cancer Brother 54.00    Other - See Comments Brother 24.00        MVA    Hypertension Brother     Heart Failure Paternal Aunt     Pancreatic Cancer Paternal Aunt     No Known Problems Daughter     No Known Problems Son     No Known Problems Son     Colon Cancer No family hx of     Substance Abuse No family hx of     Donna Parkinson  "White syndrome No family hx of        Social History   reports that he has never smoked. He has never used smokeless tobacco. He reports current alcohol use of about 16.0 standard drinks of alcohol per week. He reports that he does not use drugs.    Objective:   Vital signs in last 24 hours:  /70 (BP Location: Right arm, Patient Position: Sitting, Cuff Size: Adult Large)   Pulse 68   Resp 16   Ht 1.803 m (5' 11\")   Wt 89.4 kg (197 lb)   BMI 27.48 kg/m      Physical Exam:  General: The patient is alert oriented to person place and situation.  The patient is in no acute distress at the time of my evaluation.  Eyes: Pupils are equal, round, and reactive to light.  Conjunctiva and sclera are clear.  ENT: Oral mucosa is moist and without redness. No evident nasal discharge.  Pulmonary: Lungs are clear bilaterally with no rales, rhonchi, or wheezes.    Cardiovascular exam: Rhythm is regular. S1 and S2 are normal. No significant murmur is present. JVP is normal. Lower extremities demonstrate no significant edema. Distal pulses are intact bilaterally.  Abdomen is soft, nontender, no organomegaly, and bowel sounds present.  Musculoskeletal: Spine is straight. Extremities without deformity.  Neuro: Gait is normal.   Skin is warm, dry, and otherwise intact.      Cardiographics:       Lab Results:         Outside record review:        "

## 2023-11-20 DIAGNOSIS — K21.9 GASTROESOPHAGEAL REFLUX DISEASE WITHOUT ESOPHAGITIS: ICD-10-CM

## 2023-11-26 ENCOUNTER — HEALTH MAINTENANCE LETTER (OUTPATIENT)
Age: 63
End: 2023-11-26

## 2023-12-28 ASSESSMENT — ENCOUNTER SYMPTOMS
ARTHRALGIAS: 0
HEMATOCHEZIA: 0
NAUSEA: 0
EYE PAIN: 0
DIZZINESS: 0
CONSTIPATION: 0
CHILLS: 0
COUGH: 1
HEADACHES: 0
SHORTNESS OF BREATH: 0
PARESTHESIAS: 0
DIARRHEA: 0
PALPITATIONS: 0
FEVER: 0
NERVOUS/ANXIOUS: 0
MYALGIAS: 0
ABDOMINAL PAIN: 0
WEAKNESS: 0
HEMATURIA: 0
SORE THROAT: 0
FREQUENCY: 1
DYSURIA: 0
HEARTBURN: 1
JOINT SWELLING: 0

## 2024-01-03 DIAGNOSIS — K21.9 GASTROESOPHAGEAL REFLUX DISEASE WITHOUT ESOPHAGITIS: ICD-10-CM

## 2024-01-04 ENCOUNTER — OFFICE VISIT (OUTPATIENT)
Dept: FAMILY MEDICINE | Facility: CLINIC | Age: 64
End: 2024-01-04
Payer: COMMERCIAL

## 2024-01-04 VITALS
BODY MASS INDEX: 27.8 KG/M2 | DIASTOLIC BLOOD PRESSURE: 87 MMHG | HEIGHT: 70 IN | TEMPERATURE: 97.7 F | WEIGHT: 194.2 LBS | OXYGEN SATURATION: 97 % | SYSTOLIC BLOOD PRESSURE: 136 MMHG | HEART RATE: 51 BPM | RESPIRATION RATE: 16 BRPM

## 2024-01-04 DIAGNOSIS — Z12.5 SCREENING FOR PROSTATE CANCER: ICD-10-CM

## 2024-01-04 DIAGNOSIS — R06.83 SNORES: ICD-10-CM

## 2024-01-04 DIAGNOSIS — Z13.220 SCREENING FOR LIPID DISORDERS: ICD-10-CM

## 2024-01-04 DIAGNOSIS — K21.9 GASTROESOPHAGEAL REFLUX DISEASE WITHOUT ESOPHAGITIS: ICD-10-CM

## 2024-01-04 DIAGNOSIS — Z13.29 SCREENING FOR ENDOCRINE, NUTRITIONAL, METABOLIC AND IMMUNITY DISORDER: ICD-10-CM

## 2024-01-04 DIAGNOSIS — Z11.4 SCREENING FOR HIV (HUMAN IMMUNODEFICIENCY VIRUS): ICD-10-CM

## 2024-01-04 DIAGNOSIS — Z13.21 SCREENING FOR ENDOCRINE, NUTRITIONAL, METABOLIC AND IMMUNITY DISORDER: ICD-10-CM

## 2024-01-04 DIAGNOSIS — Z00.00 ENCOUNTER FOR ROUTINE ADULT HEALTH EXAMINATION WITHOUT ABNORMAL FINDINGS: Primary | ICD-10-CM

## 2024-01-04 DIAGNOSIS — I47.10 SUPRAVENTRICULAR TACHYCARDIA (H): ICD-10-CM

## 2024-01-04 DIAGNOSIS — Z11.59 NEED FOR HEPATITIS C SCREENING TEST: ICD-10-CM

## 2024-01-04 DIAGNOSIS — Z12.11 SPECIAL SCREENING FOR MALIGNANT NEOPLASMS, COLON: ICD-10-CM

## 2024-01-04 DIAGNOSIS — Z13.0 SCREENING FOR ENDOCRINE, NUTRITIONAL, METABOLIC AND IMMUNITY DISORDER: ICD-10-CM

## 2024-01-04 DIAGNOSIS — Z13.228 SCREENING FOR ENDOCRINE, NUTRITIONAL, METABOLIC AND IMMUNITY DISORDER: ICD-10-CM

## 2024-01-04 DIAGNOSIS — Z13.1 SCREENING FOR DIABETES MELLITUS: ICD-10-CM

## 2024-01-04 PROBLEM — K40.90 UNILATERAL INGUINAL HERNIA WITHOUT OBSTRUCTION OR GANGRENE, RECURRENCE NOT SPECIFIED: Status: RESOLVED | Noted: 2019-07-08 | Resolved: 2024-01-04

## 2024-01-04 LAB
ALT SERPL W P-5'-P-CCNC: 22 U/L (ref 0–70)
ANION GAP SERPL CALCULATED.3IONS-SCNC: 10 MMOL/L (ref 7–15)
BUN SERPL-MCNC: 20.3 MG/DL (ref 8–23)
CALCIUM SERPL-MCNC: 9.4 MG/DL (ref 8.8–10.2)
CHLORIDE SERPL-SCNC: 103 MMOL/L (ref 98–107)
CHOLEST SERPL-MCNC: 222 MG/DL
CREAT SERPL-MCNC: 1.04 MG/DL (ref 0.67–1.17)
DEPRECATED HCO3 PLAS-SCNC: 28 MMOL/L (ref 22–29)
EGFRCR SERPLBLD CKD-EPI 2021: 81 ML/MIN/1.73M2
FASTING STATUS PATIENT QL REPORTED: YES
GLUCOSE SERPL-MCNC: 108 MG/DL (ref 70–99)
HCV AB SERPL QL IA: NONREACTIVE
HDLC SERPL-MCNC: 41 MG/DL
HIV 1+2 AB+HIV1 P24 AG SERPL QL IA: NONREACTIVE
LDLC SERPL CALC-MCNC: 141 MG/DL
NONHDLC SERPL-MCNC: 181 MG/DL
POTASSIUM SERPL-SCNC: 5 MMOL/L (ref 3.4–5.3)
PSA SERPL DL<=0.01 NG/ML-MCNC: 2.91 NG/ML (ref 0–4.5)
SODIUM SERPL-SCNC: 141 MMOL/L (ref 135–145)
TRIGL SERPL-MCNC: 201 MG/DL

## 2024-01-04 PROCEDURE — 99207 E-CONSULT TO SLEEP MEDICINE (ADULT OUTPT PROVIDER TO SPECIALIST WRITTEN QUESTION & RESPONSE): CPT | Performed by: FAMILY MEDICINE

## 2024-01-04 PROCEDURE — 36415 COLL VENOUS BLD VENIPUNCTURE: CPT | Performed by: FAMILY MEDICINE

## 2024-01-04 PROCEDURE — 80048 BASIC METABOLIC PNL TOTAL CA: CPT | Performed by: FAMILY MEDICINE

## 2024-01-04 PROCEDURE — 87389 HIV-1 AG W/HIV-1&-2 AB AG IA: CPT | Performed by: FAMILY MEDICINE

## 2024-01-04 PROCEDURE — 80061 LIPID PANEL: CPT | Performed by: FAMILY MEDICINE

## 2024-01-04 PROCEDURE — 99386 PREV VISIT NEW AGE 40-64: CPT | Performed by: FAMILY MEDICINE

## 2024-01-04 PROCEDURE — 84460 ALANINE AMINO (ALT) (SGPT): CPT | Performed by: FAMILY MEDICINE

## 2024-01-04 PROCEDURE — 86803 HEPATITIS C AB TEST: CPT | Performed by: FAMILY MEDICINE

## 2024-01-04 PROCEDURE — G0103 PSA SCREENING: HCPCS | Performed by: FAMILY MEDICINE

## 2024-01-04 ASSESSMENT — ENCOUNTER SYMPTOMS
COUGH: 1
JOINT SWELLING: 0
DIZZINESS: 0
MYALGIAS: 0
DYSURIA: 0
HEMATURIA: 0
SHORTNESS OF BREATH: 0
ABDOMINAL PAIN: 0
HEMATOCHEZIA: 0
DIARRHEA: 0
SORE THROAT: 0
CHILLS: 0
ARTHRALGIAS: 0
EYE PAIN: 0
HEADACHES: 0
PARESTHESIAS: 0
NERVOUS/ANXIOUS: 0
NAUSEA: 0
HEARTBURN: 1
FREQUENCY: 1
PALPITATIONS: 0
CONSTIPATION: 0
FEVER: 0
WEAKNESS: 0

## 2024-01-04 NOTE — PROGRESS NOTES
SUBJECTIVE:   Saurabh is a 63 year old, presenting for the following:  Physical (fasting) and Refill Request        1/4/2024     7:31 AM   Additional Questions   Roomed by xl     Chief Complaint   Patient presents with    Physical     fasting    Refill Request     Wonders about stress test.  Was ordered in September by Dr. José but not scheduled.  SVT?  Metoprolol.     Healthy Habits:     Getting at least 3 servings of Calcium per day:  Yes    Bi-annual eye exam:  Yes    Dental care twice a year:  Yes    Sleep apnea or symptoms of sleep apnea:  Excessive snoring    Diet:  Regular (no restrictions)    Frequency of exercise:  None    Taking medications regularly:  Yes    Medication side effects:  None    Additional concerns today:  No    Today's PHQ-2 Score:       1/4/2024     7:20 AM   PHQ-2 ( 1999 [employer])   Q1: Little interest or pleasure in doing things 0   Q2: Feeling down, depressed or hopeless 0   PHQ-2 Score 0   Q1: Little interest or pleasure in doing things Not at all   Q2: Feeling down, depressed or hopeless Not at all   PHQ-2 Score 0     Social History     Tobacco Use    Smoking status: Never    Smokeless tobacco: Never   Substance Use Topics    Alcohol use: Yes     Alcohol/week: 16.0 standard drinks of alcohol     Comment: Alcoholic Drinks/day: 2 drinks daily             12/28/2023    10:27 AM   Alcohol Use   Prescreen: >3 drinks/day or >7 drinks/week? No       Last PSA:   Prostate Specific Antigen Screen   Date Value Ref Range Status   02/01/2019 1.5 0.0 - 3.5 ng/mL Final       Reviewed orders with patient. Reviewed health maintenance and updated orders accordingly - Yes      Reviewed and updated as needed this visit by clinical staff   Tobacco  Allergies  Meds  Problems  Med Hx  Surg Hx  Fam Hx          Reviewed and updated as needed this visit by Provider   Tobacco  Allergies  Meds  Problems  Med Hx  Surg Hx  Fam Hx           Review of Systems   Constitutional:  Negative for chills and  "fever.   HENT:  Positive for congestion. Negative for ear pain, hearing loss and sore throat.    Eyes:  Negative for pain and visual disturbance.   Respiratory:  Positive for cough. Negative for shortness of breath.    Cardiovascular:  Negative for chest pain, palpitations and peripheral edema.   Gastrointestinal:  Positive for heartburn. Negative for abdominal pain, constipation, diarrhea, hematochezia and nausea.   Genitourinary:  Positive for frequency. Negative for dysuria, genital sores, hematuria, impotence, penile discharge and urgency.   Musculoskeletal:  Negative for arthralgias, joint swelling and myalgias.   Skin:  Negative for rash.   Neurological:  Negative for dizziness, weakness, headaches and paresthesias.   Psychiatric/Behavioral:  Negative for mood changes. The patient is not nervous/anxious.      OBJECTIVE:   /87 (BP Location: Left arm, Patient Position: Sitting, Cuff Size: Adult Regular)   Pulse 51   Temp 97.7  F (36.5  C) (Oral)   Resp 16   Ht 1.784 m (5' 10.25\")   Wt 88.1 kg (194 lb 3.2 oz)   SpO2 97%   BMI 27.67 kg/m      Physical Exam  Vitals reviewed.   Constitutional:       General: He is not in acute distress.     Appearance: Normal appearance. He is not ill-appearing.   HENT:      Head: Normocephalic and atraumatic.      Right Ear: External ear normal.      Left Ear: External ear normal.      Nose: Nose normal.      Mouth/Throat:      Pharynx: Oropharynx is clear. No oropharyngeal exudate or posterior oropharyngeal erythema.   Eyes:      General: No scleral icterus.        Right eye: No discharge.         Left eye: No discharge.      Extraocular Movements: Extraocular movements intact.      Conjunctiva/sclera: Conjunctivae normal.      Pupils: Pupils are equal, round, and reactive to light.   Neck:      Comments: No thyromegaly.  Cardiovascular:      Rate and Rhythm: Normal rate and regular rhythm.      Heart sounds: Normal heart sounds. No murmur heard.     No friction rub. " No gallop.   Pulmonary:      Effort: Pulmonary effort is normal. No respiratory distress.      Breath sounds: Normal breath sounds. No wheezing or rales.   Abdominal:      General: There is no distension.      Palpations: Abdomen is soft. There is no mass.      Tenderness: There is no abdominal tenderness.   Musculoskeletal:         General: No signs of injury. Normal range of motion.      Cervical back: Normal range of motion.      Right lower leg: No edema.      Left lower leg: No edema.   Lymphadenopathy:      Cervical: No cervical adenopathy.   Skin:     General: Skin is warm.      Coloration: Skin is not jaundiced.      Findings: No rash.   Neurological:      General: No focal deficit present.      Mental Status: He is alert and oriented to person, place, and time.      Cranial Nerves: No cranial nerve deficit.      Deep Tendon Reflexes: Reflexes normal.   Psychiatric:         Mood and Affect: Mood normal.         Diagnostic Test Results:  Labs reviewed in Epic    ASSESSMENT/PLAN:     Problem List Items Addressed This Visit       Encounter for routine adult health examination without abnormal findings - Primary     Annual exam.  No specific concerns.  Request fasting lab work.  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  Declines vaccines today.  Blood work as ordered.  Some mild obstructive symptoms of lower urinary tract and a family history of prostate cancer.  Resume practice of checking PSA.         Gastroesophageal reflux disease without esophagitis     Had upper endoscopy in the past without Kay's esophagus or esophagitis.  Continue omeprazole.  Discussed dietary component.         Relevant Medications    omeprazole (PRILOSEC) 20 MG DR capsule    Supraventricular tachycardia     Managed through Dr. José (cardiology).  Continue metoprolol.          Other Visit Diagnoses       Screening for HIV (human immunodeficiency virus)        Relevant Orders    HIV Antigen  "Antibody Combo    Need for hepatitis C screening test        Relevant Orders    Hepatitis C Screen Reflex to HCV RNA Quant and Genotype    Snores        Relevant Orders    Adult E-Consult to Sleep Medicine (Outpt Provider to Specialist Written Question & Response)    Screening for lipid disorders        Relevant Orders    Lipid panel reflex to direct LDL Fasting    Screening for diabetes mellitus        Relevant Orders    Basic metabolic panel  (Ca, Cl, CO2, Creat, Gluc, K, Na, BUN)    ALT    Screening for endocrine, nutritional, metabolic and immunity disorder        Screening for prostate cancer        Relevant Orders    PSA, screen    Special screening for malignant neoplasms, colon        Relevant Orders    Colonoscopy Screening  Referral          Patient has been advised of split billing requirements and indicates understanding: Yes    COUNSELING:   Reviewed preventive health counseling, as reflected in patient instructions       Regular exercise       Healthy diet/nutrition    The 10-year ASCVD risk score (Reggie MENDOZA, et al., 2019) is: 9.7%    Values used to calculate the score:      Age: 63 years      Sex: Male      Is Non- : No      Diabetic: No      Tobacco smoker: No      Systolic Blood Pressure: 136 mmHg      Is BP treated: No      HDL Cholesterol: 62 mg/dL      Total Cholesterol: 200 mg/dL    BMI:   Estimated body mass index is 27.67 kg/m  as calculated from the following:    Height as of this encounter: 1.784 m (5' 10.25\").    Weight as of this encounter: 88.1 kg (194 lb 3.2 oz).   Weight management plan: Discussed healthy diet and exercise guidelines    He reports that he has never smoked. He has never used smokeless tobacco.        Willam Mooney MD  [facility]  "

## 2024-01-04 NOTE — Clinical Note
Dr. José and team, this patient came into clinic today for physical.  Cholesterol panel ordered.  He was never scheduled for stress echocardiogram as discussed during his visit in September.  Wondering if your team can reach out to him and help schedule? He has had no worsening/new symptoms of chest discomfort.

## 2024-01-08 ENCOUNTER — E-CONSULT (OUTPATIENT)
Dept: SLEEP MEDICINE | Facility: CLINIC | Age: 64
End: 2024-01-08
Payer: COMMERCIAL

## 2024-01-08 DIAGNOSIS — G47.33 OSA (OBSTRUCTIVE SLEEP APNEA): Primary | ICD-10-CM

## 2024-01-08 PROCEDURE — 99451 NTRPROF PH1/NTRNET/EHR 5/>: CPT | Performed by: INTERNAL MEDICINE

## 2024-01-08 NOTE — PROGRESS NOTES
1/8/2024     E-Consult has been accepted.    Interprofessional consultation requested by:  Willam Mooney MD      Clinical Question/Purpose: MY CLINICAL QUESTION IS: SIMEON? [employer] 4/8    Patient assessment and information reviewed: 63 year old male (BMI 28) with habitual snoring and observed apneas in the setting of history paroxysmal supraventricular tachycardia with high risk of obstructive sleep apnea.    Recommendations: Home sleep testing and therapy determination visit will be arranged through sleep medicine services.        The recommendations provided in this E-Consult are based on a review of clinical data pertinent to the clinical question presented, without a review of the patient's complete medical record or, the benefit of a comprehensive in-person or virtual patient evaluation. This consultation should not replace the clinical judgement and evaluation of the provider ordering this E-Consult. Any new clinical issues, or changes in patient status since the filing of this E-Consult will need to be taken into account when assessing these recommendations. Please contact me if you have further questions.    My total time spent reviewing clinical information and formulating assessment was 10 minutes.        RENA HURT MD

## 2024-01-12 ENCOUNTER — MYC MEDICAL ADVICE (OUTPATIENT)
Dept: FAMILY MEDICINE | Facility: CLINIC | Age: 64
End: 2024-01-12
Payer: COMMERCIAL

## 2024-01-12 DIAGNOSIS — E78.5 HYPERLIPIDEMIA, UNSPECIFIED HYPERLIPIDEMIA TYPE: Primary | ICD-10-CM

## 2024-01-15 DIAGNOSIS — K21.9 GASTROESOPHAGEAL REFLUX DISEASE WITHOUT ESOPHAGITIS: ICD-10-CM

## 2024-01-15 RX ORDER — ROSUVASTATIN CALCIUM 10 MG/1
10 TABLET, COATED ORAL DAILY
Qty: 90 TABLET | Refills: 0 | Status: SHIPPED | OUTPATIENT
Start: 2024-01-15 | End: 2024-01-17

## 2024-01-16 DIAGNOSIS — I47.10 PSVT (PAROXYSMAL SUPRAVENTRICULAR TACHYCARDIA) (H): ICD-10-CM

## 2024-01-16 RX ORDER — METOPROLOL SUCCINATE 25 MG/1
25 TABLET, EXTENDED RELEASE ORAL DAILY
Qty: 90 TABLET | Refills: 2 | Status: SHIPPED | OUTPATIENT
Start: 2024-01-16 | End: 2024-08-20

## 2024-01-17 RX ORDER — ROSUVASTATIN CALCIUM 10 MG/1
10 TABLET, COATED ORAL DAILY
Qty: 90 TABLET | Refills: 1 | Status: SHIPPED | OUTPATIENT
Start: 2024-01-17 | End: 2024-06-17

## 2024-02-14 ENCOUNTER — HOSPITAL ENCOUNTER (OUTPATIENT)
Dept: CARDIOLOGY | Facility: HOSPITAL | Age: 64
Discharge: HOME | End: 2024-02-14
Attending: INTERNAL MEDICINE | Admitting: INTERNAL MEDICINE
Payer: COMMERCIAL

## 2024-02-14 DIAGNOSIS — R07.9 EXERTIONAL CHEST PAIN: ICD-10-CM

## 2024-02-14 DIAGNOSIS — I47.10 PSVT (PAROXYSMAL SUPRAVENTRICULAR TACHYCARDIA) (H): ICD-10-CM

## 2024-02-14 PROCEDURE — 93350 STRESS TTE ONLY: CPT | Mod: 26 | Performed by: GENERAL ACUTE CARE HOSPITAL

## 2024-02-14 PROCEDURE — C8928 TTE W OR W/O FOL W/CON,STRES: HCPCS

## 2024-02-14 PROCEDURE — 255N000002 HC RX 255 OP 636: Performed by: INTERNAL MEDICINE

## 2024-02-14 PROCEDURE — 93325 DOPPLER ECHO COLOR FLOW MAPG: CPT | Mod: 26 | Performed by: GENERAL ACUTE CARE HOSPITAL

## 2024-02-14 PROCEDURE — 93352 ADMIN ECG CONTRAST AGENT: CPT | Performed by: GENERAL ACUTE CARE HOSPITAL

## 2024-02-14 PROCEDURE — 93016 CV STRESS TEST SUPVJ ONLY: CPT | Performed by: INTERNAL MEDICINE

## 2024-02-14 PROCEDURE — 93018 CV STRESS TEST I&R ONLY: CPT | Performed by: GENERAL ACUTE CARE HOSPITAL

## 2024-02-14 PROCEDURE — 93321 DOPPLER ECHO F-UP/LMTD STD: CPT | Mod: 26 | Performed by: GENERAL ACUTE CARE HOSPITAL

## 2024-02-14 RX ADMIN — PERFLUTREN 4 ML: 6.52 INJECTION, SUSPENSION INTRAVENOUS at 09:55

## 2024-03-05 ENCOUNTER — TRANSFERRED RECORDS (OUTPATIENT)
Dept: HEALTH INFORMATION MANAGEMENT | Facility: CLINIC | Age: 64
End: 2024-03-05
Payer: COMMERCIAL

## 2024-03-14 ENCOUNTER — PATIENT OUTREACH (OUTPATIENT)
Dept: GASTROENTEROLOGY | Facility: CLINIC | Age: 64
End: 2024-03-14
Payer: COMMERCIAL

## 2024-06-17 DIAGNOSIS — E78.5 HYPERLIPIDEMIA, UNSPECIFIED HYPERLIPIDEMIA TYPE: ICD-10-CM

## 2024-06-17 PROBLEM — Z76.89 HEALTH CARE HOME: Status: RESOLVED | Noted: 2019-07-08 | Resolved: 2024-06-17

## 2024-06-17 RX ORDER — ROSUVASTATIN CALCIUM 10 MG/1
10 TABLET, COATED ORAL DAILY
Qty: 90 TABLET | Refills: 1 | Status: SHIPPED | OUTPATIENT
Start: 2024-06-17 | End: 2024-08-16

## 2024-06-24 ENCOUNTER — TELEPHONE (OUTPATIENT)
Dept: FAMILY MEDICINE | Facility: CLINIC | Age: 64
End: 2024-06-24
Payer: COMMERCIAL

## 2024-06-24 NOTE — TELEPHONE ENCOUNTER
[facility]  Fax:[phone]  Spec: [phone]  MO: [phone]

## 2024-06-27 NOTE — TELEPHONE ENCOUNTER
Central Prior Authorization Team  Phone: [phone]    PA Initiation    Medication: OMEPRAZOLE 20 MG PO CPDR  Insurance Company: [employer] - Phone [phone] Fax [phone]  Pharmacy Filling the Rx: [facility] - [place], [place] - [address]  Filling Pharmacy Phone: [phone]  Filling Pharmacy Fax:    Start Date: 6/27/2024

## 2024-06-27 NOTE — TELEPHONE ENCOUNTER
Prior Authorization Approval    Medication: OMEPRAZOLE 20 MG PO CPDR  Authorization Effective Date: 6/27/2024  Authorization Expiration Date: 6/27/2027  Approved Dose/Quantity:  Reference #:     Insurance Company: [email or web address] - Phone [phone] Fax [phone]  Expected CoPay: $    CoPay Card Available:      Financial Assistance Needed:   Which Pharmacy is filling the prescription: [facility] - [place] [address]  Pharmacy Notified: Yes  Patient Notified: **Instructed pharmacy to notify patient when script is ready to /ship.**

## 2024-07-21 ASSESSMENT — SLEEP AND FATIGUE QUESTIONNAIRES
HOW LIKELY ARE YOU TO NOD OFF OR FALL ASLEEP WHILE SITTING AND READING: WOULD NEVER DOZE
HOW LIKELY ARE YOU TO NOD OFF OR FALL ASLEEP WHILE SITTING QUIETLY AFTER LUNCH WITHOUT ALCOHOL: WOULD NEVER DOZE
HOW LIKELY ARE YOU TO NOD OFF OR FALL ASLEEP IN A CAR, WHILE STOPPED FOR A FEW MINUTES IN TRAFFIC: WOULD NEVER DOZE
HOW LIKELY ARE YOU TO NOD OFF OR FALL ASLEEP WHILE SITTING AND TALKING TO SOMEONE: WOULD NEVER DOZE
HOW LIKELY ARE YOU TO NOD OFF OR FALL ASLEEP WHILE WATCHING TV: SLIGHT CHANCE OF DOZING
HOW LIKELY ARE YOU TO NOD OFF OR FALL ASLEEP WHEN YOU ARE A PASSENGER IN A CAR FOR AN HOUR WITHOUT A BREAK: WOULD NEVER DOZE
HOW LIKELY ARE YOU TO NOD OFF OR FALL ASLEEP WHILE SITTING INACTIVE IN A PUBLIC PLACE: WOULD NEVER DOZE
HOW LIKELY ARE YOU TO NOD OFF OR FALL ASLEEP WHILE LYING DOWN TO REST IN THE AFTERNOON WHEN CIRCUMSTANCES PERMIT: WOULD NEVER DOZE

## 2024-07-22 ENCOUNTER — TELEPHONE (OUTPATIENT)
Dept: FAMILY MEDICINE | Facility: CLINIC | Age: 64
End: 2024-07-22
Payer: COMMERCIAL

## 2024-07-22 DIAGNOSIS — E78.5 HYPERLIPIDEMIA, UNSPECIFIED HYPERLIPIDEMIA TYPE: Primary | ICD-10-CM

## 2024-07-22 NOTE — TELEPHONE ENCOUNTER
[facility]    Fax: [phone]    Spec: [phone]    MO: [phone]

## 2024-07-24 NOTE — TELEPHONE ENCOUNTER
PA Initiation    Medication: ROSUVASTATIN CALCIUM 10 MG PO TABS  Insurance Company: [employer] - Phone [phone] Fax [phone]  Pharmacy Filling the Rx: [facility] - [place], [place] - [facility] [address]  Filling Pharmacy Phone: [phone]  Filling Pharmacy Fax:    Start Date: 7/24/2024  Retail Pharmacy Prior Authorization Team   Phone: [phone]

## 2024-07-25 ENCOUNTER — OFFICE VISIT (OUTPATIENT)
Dept: SLEEP MEDICINE | Facility: CLINIC | Age: 64
End: 2024-07-25
Payer: COMMERCIAL

## 2024-07-25 DIAGNOSIS — G47.33 OSA (OBSTRUCTIVE SLEEP APNEA): ICD-10-CM

## 2024-07-25 PROCEDURE — G0399 HOME SLEEP TEST/TYPE 3 PORTA: HCPCS | Performed by: INTERNAL MEDICINE

## 2024-07-25 NOTE — CONFIDENTIAL NOTE
Update: Alternative sent.  Atorvastatin sent to mail order pharmacy.        Please call patient and ask if he knows anything about this.  He has been on the medicine for the past 5-6 months.  Have they been covering it up till now.  His insurance requires a prior authorization for a medicine that cost $4 per month (according to GoodRx).

## 2024-07-25 NOTE — PROGRESS NOTES
Pt is completing a home sleep test. Pt was instructed on how to put on the Noxturnal T3 device and associated equipment before going to bed and given the opportunity to practice putting it on before leaving the sleep center. Pt was reminded to bring the home sleep test kit back to the center tomorrow, at the scheduled time for download and reporting. Patient was instructed to complete study using the following treatment?  None  Neck circumference: 40 CM / 15.75 inches.

## 2024-07-25 NOTE — TELEPHONE ENCOUNTER
PRIOR AUTHORIZATION DENIED    Medication: ROSUVASTATIN CALCIUM 10 MG PO TABS  Insurance Company: "[employer] - Phone [phone] Fax [phone]  Denial Date: 7/24/2024  Denial Reason(s):     Appeal Information:     Patient Notified: The clinic should notify the patient of the denial.

## 2024-07-26 ENCOUNTER — DOCUMENTATION ONLY (OUTPATIENT)
Dept: SLEEP MEDICINE | Facility: CLINIC | Age: 64
End: 2024-07-26
Payer: COMMERCIAL

## 2024-07-26 NOTE — TELEPHONE ENCOUNTER
Spoke with patient. He reports insurance has covered this up to this point and he is not aware of any plan changes. Patient will contact pharmacy to check cost and, if needed, use coupon to pay out of pocket.

## 2024-07-29 NOTE — PROGRESS NOTES
HST POST-STUDY QUESTIONNAIRE    What time did you go to bed?  11pm  How long do you think it took to fall asleep?  15min  What time did you wake up to start the day?  0630  Did you get up during the night at all?  yes  If you woke up, do you remember approximately what time(s)? 0230/0530  Did you have any difficulty with the equipment?  No  Did you us any type of treatment with this study?  None  Was the head of the bed elevated? No  Did you sleep in a recliner?  No  Did you stop using CPAP at least 3 days before this test?  NA  Any other information you'd like us to know? -

## 2024-08-01 NOTE — PROGRESS NOTES
This HSAT was performed using a Noxturnal T3 device which recorded snore, sound, movement activity, body position, nasal pressure, oronasal thermal airflow, pulse, oximetry and both chest and abdominal respiratory effort. HSAT data was restricted to the time patient states they were in bed.     HSAT was scored using 1B 4% hypopnea rule.     HST AHI (Non-PAT): 18.1  Snoring was reported as moderate.  Time with SpO2 below 89% was 6.1 minutes.   Overall signal quality was good     Pt will follow up with sleep provider to determine appropriate therapy.

## 2024-08-02 NOTE — PROCEDURES
"HOME SLEEP STUDY INTERPRETATION        Patient: Saurabh Morel  MRN: [id number]  YOB: 1960  Study Date: 2024  PCP/Referring Provider: Willam Mooney  Ordering Provider:   Ramos Bloom MD         Indications for Home Study: Saurabh Morel is a 63 year old male with a history of supraventricular tachycardia who presents with symptoms suggestive of obstructive sleep apnea during eConsult evaluation.    Estimated body mass index is 27.67 kg/m  as calculated from the following:    Height as of 24: 1.784 m (5' 10.25\").    Weight as of 24: 88.1 kg (194 lb 3.2 oz).  Total score - [place]: 1 (2024  9:48 AM)  STOP-BAN/8        Data: A full night home sleep study was performed recording the standard physiologic parameters including body position, movement, sound, nasal pressure, thermal oral airflow, chest and abdominal movements with respiratory inductance plethysmography, and oxygen saturation by pulse oximetry. Pulse rate was estimated by oximetry recording. This study was considered adequate based on > 4 hours of quality oximetry and respiratory recording. As specified by the AASM Manual for the Scoring of Sleep and Associated events, version 2.3, Rule VIII.D 1B, 4% oxygen desaturation scoring for hypopneas is used as a standard of care on all home sleep apnea testing.        Analysis Time:  428 minutes        Respiration:   Sleep Associated Hypoxemia: minimal sustained hypoxemia was present. Baseline oxygen saturation was 93%.  Time with saturation less than or equal to 88% was 6 minutes. The lowest oxygen saturation was 85%.   Snoring: Snoring was present.  Respiratory events: The home study revealed a presence of 81 obstructive apneas and 8 mixed and central apneas. There were 38 hypopneas resulting in a combined apnea/hypopnea index [AHI] of 18.1 events per hour.  AHI was 53 per hour supine, - per hour prone, 27 per hour on left side, and 4 per hour on right side.   Pattern: " Excluding events noted above, respiratory rate and pattern was Normal.      Position: Percent of time spent: supine - 26%, prone - 0%, on left - 5%, on right - 68%.      Heart Rate: By pulse oximetry normal rate was noted.       Assessment:   Moderate obstructive sleep apnea.  Minimal sleep associated hypoxemia was present.    Recommendations:  Mandibular advancement device or auto-titration CPAP therapy could be considered based on patient preference and clinical setting.   Suggest optimizing sleep hygiene and avoiding sleep deprivation.        Diagnosis Code(s): Obstructive Sleep Apnea G47.33, Hypoxemia G47.36    Electronically signed by: RENA HURT MD, August 2, 2024   Diplomate, American Board of Internal Medicine, Sleep Medicine

## 2024-08-08 RX ORDER — ATORVASTATIN CALCIUM 20 MG/1
20 TABLET, FILM COATED ORAL DAILY
Qty: 90 TABLET | Refills: 3 | Status: SHIPPED | OUTPATIENT
Start: 2024-08-08

## 2024-08-08 RX ORDER — ATORVASTATIN CALCIUM 20 MG/1
20 TABLET, FILM COATED ORAL DAILY
Qty: 90 TABLET | Refills: 3 | Status: SHIPPED | OUTPATIENT
Start: 2024-08-08 | End: 2024-08-08

## 2024-08-08 NOTE — TELEPHONE ENCOUNTER
Patient's spouse calling to request alternative medication. The only pharmacy they are able to use the [employer] coupon for rosuvastatin at is [facility] and he does not want to drive there monthly.    Please advise on alternative medication.

## 2024-08-09 ASSESSMENT — SLEEP AND FATIGUE QUESTIONNAIRES
HOW LIKELY ARE YOU TO NOD OFF OR FALL ASLEEP WHEN YOU ARE A PASSENGER IN A CAR FOR AN HOUR WITHOUT A BREAK: SLIGHT CHANCE OF DOZING
HOW LIKELY ARE YOU TO NOD OFF OR FALL ASLEEP WHILE SITTING QUIETLY AFTER LUNCH WITHOUT ALCOHOL: WOULD NEVER DOZE
HOW LIKELY ARE YOU TO NOD OFF OR FALL ASLEEP WHILE LYING DOWN TO REST IN THE AFTERNOON WHEN CIRCUMSTANCES PERMIT: SLIGHT CHANCE OF DOZING
HOW LIKELY ARE YOU TO NOD OFF OR FALL ASLEEP IN A CAR, WHILE STOPPED FOR A FEW MINUTES IN TRAFFIC: WOULD NEVER DOZE
HOW LIKELY ARE YOU TO NOD OFF OR FALL ASLEEP WHILE WATCHING TV: SLIGHT CHANCE OF DOZING
HOW LIKELY ARE YOU TO NOD OFF OR FALL ASLEEP WHILE SITTING AND READING: WOULD NEVER DOZE
HOW LIKELY ARE YOU TO NOD OFF OR FALL ASLEEP WHILE SITTING INACTIVE IN A PUBLIC PLACE: WOULD NEVER DOZE
HOW LIKELY ARE YOU TO NOD OFF OR FALL ASLEEP WHILE SITTING AND TALKING TO SOMEONE: WOULD NEVER DOZE

## 2024-08-16 ENCOUNTER — VIRTUAL VISIT (OUTPATIENT)
Dept: SLEEP MEDICINE | Facility: CLINIC | Age: 64
End: 2024-08-16
Payer: COMMERCIAL

## 2024-08-16 VITALS — HEIGHT: 71 IN | BODY MASS INDEX: 26.18 KG/M2 | WEIGHT: 187 LBS

## 2024-08-16 DIAGNOSIS — G47.33 OSA (OBSTRUCTIVE SLEEP APNEA): Primary | ICD-10-CM

## 2024-08-16 PROCEDURE — 99203 OFFICE O/P NEW LOW 30 MIN: CPT | Mod: 95 | Performed by: INTERNAL MEDICINE

## 2024-08-16 ASSESSMENT — PAIN SCALES - GENERAL: PAINLEVEL: NO PAIN (0)

## 2024-08-16 NOTE — NURSING NOTE
Current patient location[place] [address] [facility] [id number]    Is the patient currently in the state of [place]? YES    Visit mode:VIDEO    If the visit is dropped, the patient can be reconnected by: VIDEO VISIT: Text to cell phone:   Telephone Information:   Mobile [phone]       Will anyone else be joining the visit? NO  (If patient encounters technical issues they should call [phone])    How would you like to obtain your AVS? MyChart    Are changes needed to the allergy or medication list?NO Are refills needed on medications prescribed by this physician? NO    Rooming Documentation:  Questionnaire(s) completed      Reason for visit: RECHECK    Candida SOLIZF

## 2024-08-16 NOTE — PROGRESS NOTES
Video-Visit Details    Type of service:  Video Visit    Video Visit Start Time:7:58 AM    Video End Time:8:20 AM    Originating Location (pt. Location): Home    Distant Location (provider location):  Off-site     Platform used for Video Visit: AmWell    Additional 15 minutes on the date of service was spent performing the following:    -Preparing to see the patient  -Obtaining and/or reviewing separately obtained history   -Ordering medications, tests, or procedures   -Documenting clinical information in the electronic or other health record     Thank you for the opportunity to participate in the care of Saurabh Morel.     He is a 63 year old  male patient who comes to the sleep medicine clinic for review of sleep study results. The patient had a sleep study performed on 07/25/2024 (AHI=18.1). The patient states that he has been having witness apnea and snoring for more than a year. The patient was also found to have SVT. His PCP wanted to see if SIMEON was a contributing factor.    Assessment and Plan:  In summary Saurabh Morel is a 63 year old year old male here for review of sleep study results.  1. Obstructive Sleep Apnea  We had an extensive conversation to review the results of his sleep study and to  him on the importance of treating sleep apnea. We discussed the options of treatment with oral appliance versus CPAP. Patient decided to proceed with CPAP. He will start using the device as soon as he receives it with the intention to use if for the entire night. We discussed some tips to increase PAP tolerance as well as the normal curve of adaptation. CPAP is going to provide improved respiratory function during the night but it can cause some sleep disruption that tends to improve with continuous usage. He should return to the clinic in 7 weeks to review compliance and efficacy monitoring. Since the patient does not have any preference, we will use [employer] as the [employer]  company.     Lab reviewed: Discussed with patient.    Sleep-Wake Cycle:    The patient likes to initiate sleep at around 11 PM with a sleep latency of less than 20 minutes. The patient has 2 nocturnal awakenings. Final wake up time is around 7 AM.    JEREMY:  JEREMY Total Score: 1  Total score - [place]: 3 (8/9/2024  2:08 PM)    Patient Active Problem List   Diagnosis    Encounter for routine adult health examination without abnormal findings    Supraventricular tachycardia (H24)    Gastroesophageal reflux disease without esophagitis       Current Outpatient Medications   Medication Sig Dispense Refill    atorvastatin (LIPITOR) 20 MG tablet Take 1 tablet (20 mg) by mouth daily 90 tablet 3    ibuprofen (ADVIL/MOTRIN) 200 MG tablet Take 200-400 mg by mouth as needed      metoprolol succinate ER (TOPROL XL) 25 MG 24 hr tablet Take 1 tablet (25 mg) by mouth daily 90 tablet 2    multivitamin, therapeutic (THERA-VIT) TABS tablet Take 1 tablet by mouth daily      omeprazole (PRILOSEC) 20 MG DR capsule Take 1 capsule (20 mg) by mouth daily 90 capsule 3       Allergies   Allergen Reactions    Demerol [Meperidine] Dizziness       Visual Exam:  GEN: NAD  Psych: normal mood, normal affect       Labs/Studies:  - We reviewed the results of the overnight study as described on the HPI.     Lab Results   Component Value Date    TSH 2.06 02/01/2019     Lab Results   Component Value Date     (H) 01/04/2024     02/01/2019     Lab Results   Component Value Date    HGB 14.7 07/08/2019     Lab Results   Component Value Date    BUN 20.3 01/04/2024    BUN 23 (H) 02/01/2019    CR 1.04 01/04/2024    CR 0.99 02/01/2019     Lab Results   Component Value Date    AST 23 02/01/2019    ALT 22 01/04/2024    ALT 20 02/01/2019    ALKPHOS 59 02/01/2019    BILITOTAL 0.5 02/01/2019       Recent Labs   Lab Test 01/04/24  0826 02/01/19  0910    140   POTASSIUM 5.0 4.6   CHLORIDE 103 105   CO2 28 22   ANIONGAP 10 13   * 108   BUN 20.3  23*   CR 1.04 0.99   COLTEN 9.4 9.7         Patient verbalized understanding of these issues, agrees with the plan and all questions were answered today. Patient was given an opportuntity to voice any other symptoms or concerns not listed above. Patient did not have any other symptoms or concerns.      Desmond Valdez DO  Board Certified in Internal Medicine and Sleep Medicine      (Note created with Dragon voice recognition and unintended spelling errors and word substitutions may occur)

## 2024-08-20 DIAGNOSIS — I47.10 PSVT (PAROXYSMAL SUPRAVENTRICULAR TACHYCARDIA) (H): ICD-10-CM

## 2024-08-20 RX ORDER — METOPROLOL SUCCINATE 25 MG/1
25 TABLET, EXTENDED RELEASE ORAL DAILY
Qty: 90 TABLET | Refills: 1 | Status: SHIPPED | OUTPATIENT
Start: 2024-08-20

## 2024-08-27 ENCOUNTER — DOCUMENTATION ONLY (OUTPATIENT)
Dept: SLEEP MEDICINE | Facility: CLINIC | Age: 64
End: 2024-08-27
Payer: COMMERCIAL

## 2024-08-27 DIAGNOSIS — G47.33 OBSTRUCTIVE SLEEP APNEA (ADULT) (PEDIATRIC): Primary | ICD-10-CM

## 2024-08-27 NOTE — PROGRESS NOTES
[facility] was offered [facility]  Patient Saurabh Morel was set up at [place]  on August 27, 2024. Patient received a Resmed Airsense 11 Pressures were set at  5-15 cm H2O.   Patient s ramp is 5 cm H2O for Auto and FLEX/EPR is EPR, 2.  Patient received a Resmed Mask name: AIRFIT F20  Full Face mask size Medium, heated tubing and heated humidifier.  Patient has the following compliance requirements: none    Les Aiken

## 2024-08-30 ENCOUNTER — DOCUMENTATION ONLY (OUTPATIENT)
Dept: SLEEP MEDICINE | Facility: CLINIC | Age: 64
End: 2024-08-30
Payer: COMMERCIAL

## 2024-08-30 NOTE — PROGRESS NOTES
3 day Sleep therapy management telephone visit    Diagnostic AHI:    HST: 18.1        SUBJECTIVE: Patient reports doing ok with CPAP. He wants to switch masks due to irritation at the bridge of his nose .Will send mask fit instructions and [facility] if he is unable to get a comfortable fit.   Patient was given my contact information and instructed to reach out with any questions or concerns.       Order settings:  CPAP MIN CPAP MAX   5 cm H2O 15 cm H2O         Device settings:  CPAP MIN CPAP MAX EPR RESMED SOFT RESPONSE SETTING   5.0 cm  H20 15.0 cm  H20 TWO OFF         Patient has the following upcoming sleep appts:      Replacement device: No  STM ordered by provider: Yes     Total time spent on accessing and  interpreting remote patient PAP therapy data  10 minutes    Total time spent counseling, coaching  and reviewing PAP therapy data with patient  3 minutes

## 2024-09-11 ENCOUNTER — DOCUMENTATION ONLY (OUTPATIENT)
Dept: SLEEP MEDICINE | Facility: CLINIC | Age: 64
End: 2024-09-11
Payer: COMMERCIAL

## 2024-09-11 NOTE — PROGRESS NOTES
14  DAY STM VISIT    Diagnostic AHI:  HST: 18.1        Message left for patient to return call.     Assessment: Pt not meeting objective benchmarks for leak      Action plan: waiting for patient to return call.  and pt to have 30 day STM visit.      Device type: Auto-CPAP    PAP settings: CPAP min 5.0 cm  H20       CPAP max 15.0 cm  H20      95th% pressure 10.8 cm  H20        RESMED EPR level Setting: TWO    RESMED Soft response setting:  OFF    Mask type:      Objective measures: 14 day rolling measures      Compliance  64 %      Leak  21.34  lpm  last  upload      AHI 2.87   last  upload      Average number of minutes 295      Objective measure goal  Compliance   Goal >70%  Leak   Goal < 24 lpm  AHI  Goal < 5  Usage  Goal >240    Patient has the following upcoming sleep appts:      Total time spent on accessing and interpreting remote patient PAP therapy data  10 minutes    Total time spent counseling, coaching  and reviewing PAP therapy data with patient  1 minutes    [id number]  [id number]  no (3 day STM)

## 2025-01-17 ENCOUNTER — MYC MEDICAL ADVICE (OUTPATIENT)
Dept: FAMILY MEDICINE | Facility: CLINIC | Age: 65
End: 2025-01-17
Payer: COMMERCIAL

## 2025-01-17 ENCOUNTER — MYC MEDICAL ADVICE (OUTPATIENT)
Dept: CARDIOLOGY | Facility: CLINIC | Age: 65
End: 2025-01-17

## 2025-01-17 DIAGNOSIS — K21.9 GASTROESOPHAGEAL REFLUX DISEASE WITHOUT ESOPHAGITIS: ICD-10-CM

## 2025-01-17 DIAGNOSIS — E78.5 HYPERLIPIDEMIA, UNSPECIFIED HYPERLIPIDEMIA TYPE: ICD-10-CM

## 2025-01-20 RX ORDER — ATORVASTATIN CALCIUM 20 MG/1
20 TABLET, FILM COATED ORAL DAILY
Qty: 90 TABLET | Refills: 1 | Status: SHIPPED | OUTPATIENT
Start: 2025-01-20

## 2025-02-06 ENCOUNTER — DOCUMENTATION ONLY (OUTPATIENT)
Dept: SLEEP MEDICINE | Facility: CLINIC | Age: 65
End: 2025-02-06
Payer: COMMERCIAL

## 2025-03-02 ENCOUNTER — HEALTH MAINTENANCE LETTER (OUTPATIENT)
Age: 65
End: 2025-03-02

## 2025-03-12 DIAGNOSIS — I47.10 PSVT (PAROXYSMAL SUPRAVENTRICULAR TACHYCARDIA): ICD-10-CM

## 2025-03-12 DIAGNOSIS — K21.9 GASTROESOPHAGEAL REFLUX DISEASE WITHOUT ESOPHAGITIS: ICD-10-CM

## 2025-03-12 RX ORDER — METOPROLOL SUCCINATE 25 MG/1
25 TABLET, EXTENDED RELEASE ORAL DAILY
Qty: 90 TABLET | Refills: 0 | Status: SHIPPED | OUTPATIENT
Start: 2025-03-12

## 2025-03-13 RX ORDER — OMEPRAZOLE 20 MG/1
20 CAPSULE, DELAYED RELEASE ORAL DAILY
Qty: 15 CAPSULE | Refills: 0 | Status: SHIPPED | OUTPATIENT
Start: 2025-03-13

## 2025-04-15 ENCOUNTER — OFFICE VISIT (OUTPATIENT)
Dept: CARDIOLOGY | Facility: CLINIC | Age: 65
End: 2025-04-15

## 2025-04-15 VITALS
WEIGHT: 196 LBS | SYSTOLIC BLOOD PRESSURE: 123 MMHG | DIASTOLIC BLOOD PRESSURE: 81 MMHG | BODY MASS INDEX: 27.34 KG/M2 | HEART RATE: 56 BPM | RESPIRATION RATE: 14 BRPM

## 2025-04-15 DIAGNOSIS — I47.10 SUPRAVENTRICULAR TACHYCARDIA: Primary | ICD-10-CM

## 2025-04-15 PROCEDURE — G2211 COMPLEX E/M VISIT ADD ON: HCPCS | Performed by: INTERNAL MEDICINE

## 2025-04-15 PROCEDURE — 99215 OFFICE O/P EST HI 40 MIN: CPT | Performed by: INTERNAL MEDICINE

## 2025-04-15 NOTE — PROGRESS NOTES
[facility]  Cardiac Electrophysiology     Progress Note: Jimmy José MD    Primary Care: Willam Mooney MD    Primary Cardiologist:     Primary Electrophysiologist: Jimmy José MD    Assessment:       PSVT: Chronic problem for the patient.  Arrhythmia documented in 2019 during stress testing.  RP interval is borderline suggesting possible ORT.  Patient has been chronically treated with low-dose metoprolol and reports Patient is aware that he is a potential candidate for mapping/ablation of his arrhythmia and he prefers to remain on medical therapy at this time.    Family history positive for ASCVD: The patient underwent coronary calcium scoring 1 year ago.  The patient's score was 0.  The patient is maintained on statin therapy and was switched from rosuvastatin to atorvastatin due to insurance coverage.  He has not had a follow-up lipid panel on his new medical therapy.  Dyslipidemia: Patient is on statin therapy and followed by his primary care physician Dr. Mooney.  Recommended that the patient follow-up and have a repeat lipid panel in the next couple of months.        Recommendations:  Continue current medical therapy.  Follow-up in the arrhythmia clinic with the EP KEMI in 12 months or sooner if the patient's symptoms worsen.  I did encourage the patient to check his [employer] kemi when he feels his heart rate is mildly elevated when he misses a dose of beta-blocker.      Time spent: 45 minutes spent on the date of the encounter doing chart review, history and exam, documentation and further activities as noted above.    Subjective:  Saurabh Morel (64 year old male) returns to the arrhythmia clinic for interval reevaluation of his PSVT.  The patient reports that he had no sustained episodes of rapid tachypalpitations.  He does note that his heart rate increases somewhat if he misses a dose of his beta-blocker.  Otherwise he reports no exertional dyspnea or chest pain.  He is not having  any orthopnea, PND or ankle edema.    Current Outpatient Medications   Medication Sig Dispense Refill    atorvastatin (LIPITOR) 20 MG tablet Take 1 tablet (20 mg) by mouth daily. 90 tablet 1    ibuprofen (ADVIL/MOTRIN) 200 MG tablet Take 200-400 mg by mouth as needed      metoprolol succinate ER (TOPROL XL) 25 MG 24 hr tablet Take 1 tablet (25 mg) by mouth daily. 90 tablet 0    multivitamin, therapeutic (THERA-VIT) TABS tablet Take 1 tablet by mouth daily      omeprazole (PRILOSEC) 20 MG DR capsule Take 1 capsule by mouth daily. 15 capsule 0       Review of Systems:     Family History  Family History   Problem Relation Age of Onset    Liver Cancer Mother     Hyperlipidemia Mother     Hypertension Mother     Parkinsonism Mother     Hypertension Father     Hyperlipidemia Father     Heart Disease Father 94    CABG Father     Snoring Father     Breast Cancer Sister         ~55    Hyperlipidemia Sister     Hypertension Sister     Esophageal Cancer Brother     Hyperlipidemia Brother     Hypertension Brother     Hyperlipidemia Brother     Hypertension Brother     Esophageal Cancer Brother 54    Other - See Comments Brother 24        MVA    Hypertension Brother     Prostate Cancer Paternal Grandfather     No Known Problems Daughter     No Known Problems Son     No Known Problems Son     Pancreatic Cancer Paternal Aunt     Heart Failure Paternal Aunt     Pancreatic Cancer Paternal Aunt     Colon Cancer No family hx of     Substance Abuse No family hx of     Donna Parkinson White syndrome No family hx of        Social History   reports that he has never smoked. He has never used smokeless tobacco. He reports current alcohol use of about 16.0 standard drinks of alcohol per week. He reports that he does not use drugs.    Objective:   Vital signs in last 24 hours:  /81 (BP Location: Right arm, Patient Position: Sitting, Cuff Size: Adult Large)   Pulse 56   Resp 14   Wt 88.9 kg (196 lb)   BMI 27.34 kg/m      Physical  Exam:  General: The patient is alert oriented to person place and situation.  The patient is in no acute distress at the time of my evaluation.  Eyes: Pupils are equal, round, and reactive to light.  Conjunctiva and sclera are clear.  ENT: Oral mucosa is moist and without redness. No evident nasal discharge.  Pulmonary: Lungs are clear bilaterally with no rales, rhonchi, or wheezes.    Cardiovascular exam: Rhythm is regular. S1 and S2 are normal. No significant murmur is present. JVP is normal. Lower extremities demonstrate no significant edema. Distal pulses are intact bilaterally.  Abdomen is soft, nontender, no organomegaly, and bowel sounds present.  Musculoskeletal: Spine is straight. Extremities without deformity.  Neuro: Gait is normal.   Skin is warm, dry, and otherwise intact.      Cardiographics:       Lab Results:         Outside record review:

## 2025-04-15 NOTE — LETTER
4/15/2025    Willam Mooney MD  [address]  [facility] [address]    RE: Saurabh Morel       Dear Colleague,     I had the pleasure of seeing Saurabh Morel in the [facility].    [facility]  Cardiac Electrophysiology     Progress Note: Jimmy José MD    Primary Care: Willam Mooney MD    Primary Cardiologist:     Primary Electrophysiologist: Jimmy José MD    Assessment:       PSVT: Chronic problem for the patient.  Arrhythmia documented in 2019 during stress testing.  RP interval is borderline suggesting possible ORT.  Patient has been chronically treated with low-dose metoprolol and reports Patient is aware that he is a potential candidate for mapping/ablation of his arrhythmia and he prefers to remain on medical therapy at this time.    Family history positive for ASCVD: The patient underwent coronary calcium scoring 1 year ago.  The patient's score was 0.  The patient is maintained on statin therapy and was switched from rosuvastatin to atorvastatin due to insurance coverage.  He has not had a follow-up lipid panel on his new medical therapy.  Dyslipidemia: Patient is on statin therapy and followed by his primary care physician Dr. Mooney.  Recommended that the patient follow-up and have a repeat lipid panel in the next couple of months.        Recommendations:  Continue current medical therapy.  Follow-up in the arrhythmia clinic with the EP KEMI in 12 months or sooner if the patient's symptoms worsen.  I did encourage the patient to check his [employer] kemi when he feels his heart rate is mildly elevated when he misses a dose of beta-blocker.      Time spent: 45 minutes spent on the date of the encounter doing chart review, history and exam, documentation and further activities as noted above.    Subjective:  Saurabh Morel (64 year old male) returns to the arrhythmia clinic for interval reevaluation of his PSVT.  The patient reports that he had no sustained  episodes of rapid tachypalpitations.  He does note that his heart rate increases somewhat if he misses a dose of his beta-blocker.  Otherwise he reports no exertional dyspnea or chest pain.  He is not having any orthopnea, PND or ankle edema.    Current Outpatient Medications   Medication Sig Dispense Refill     atorvastatin (LIPITOR) 20 MG tablet Take 1 tablet (20 mg) by mouth daily. 90 tablet 1     ibuprofen (ADVIL/MOTRIN) 200 MG tablet Take 200-400 mg by mouth as needed       metoprolol succinate ER (TOPROL XL) 25 MG 24 hr tablet Take 1 tablet (25 mg) by mouth daily. 90 tablet 0     multivitamin, therapeutic (THERA-VIT) TABS tablet Take 1 tablet by mouth daily       omeprazole (PRILOSEC) 20 MG DR capsule Take 1 capsule by mouth daily. 15 capsule 0       Review of Systems:     Family History  Family History   Problem Relation Age of Onset     Liver Cancer Mother      Hyperlipidemia Mother      Hypertension Mother      Parkinsonism Mother      Hypertension Father      Hyperlipidemia Father      Heart Disease Father 94     CABG Father      Snoring Father      Breast Cancer Sister         ~55     Hyperlipidemia Sister      Hypertension Sister      Esophageal Cancer Brother      Hyperlipidemia Brother      Hypertension Brother      Hyperlipidemia Brother      Hypertension Brother      Esophageal Cancer Brother 54     Other - See Comments Brother 24        MVA     Hypertension Brother      Prostate Cancer Paternal Grandfather      No Known Problems Daughter      No Known Problems Son      No Known Problems Son      Pancreatic Cancer Paternal Aunt      Heart Failure Paternal Aunt      Pancreatic Cancer Paternal Aunt      Colon Cancer No family hx of      Substance Abuse No family hx of      Donna Parkinson White syndrome No family hx of        Social History   reports that he has never smoked. He has never used smokeless tobacco. He reports current alcohol use of about 16.0 standard drinks of alcohol per week. He  reports that he does not use drugs.    Objective:   Vital signs in last 24 hours:  /81 (BP Location: Right arm, Patient Position: Sitting, Cuff Size: Adult Large)   Pulse 56   Resp 14   Wt 88.9 kg (196 lb)   BMI 27.34 kg/m      Physical Exam:  General: The patient is alert oriented to person place and situation.  The patient is in no acute distress at the time of my evaluation.  Eyes: Pupils are equal, round, and reactive to light.  Conjunctiva and sclera are clear.  ENT: Oral mucosa is moist and without redness. No evident nasal discharge.  Pulmonary: Lungs are clear bilaterally with no rales, rhonchi, or wheezes.    Cardiovascular exam: Rhythm is regular. S1 and S2 are normal. No significant murmur is present. JVP is normal. Lower extremities demonstrate no significant edema. Distal pulses are intact bilaterally.  Abdomen is soft, nontender, no organomegaly, and bowel sounds present.  Musculoskeletal: Spine is straight. Extremities without deformity.  Neuro: Gait is normal.   Skin is warm, dry, and otherwise intact.      Cardiographics:       Lab Results:         Outside record review:          Thank you for allowing me to participate in the care of your patient.      Sincerely,     Jimmy José MD     [facility]  cc:   Jimmy José MD  [address]  [place] [address]

## 2025-05-17 DIAGNOSIS — I47.10 PSVT (PAROXYSMAL SUPRAVENTRICULAR TACHYCARDIA): ICD-10-CM

## 2025-05-19 RX ORDER — METOPROLOL SUCCINATE 25 MG/1
25 TABLET, EXTENDED RELEASE ORAL DAILY
Qty: 90 TABLET | Refills: 3 | Status: SHIPPED | OUTPATIENT
Start: 2025-05-19

## 2025-06-29 DIAGNOSIS — E78.5 HYPERLIPIDEMIA, UNSPECIFIED HYPERLIPIDEMIA TYPE: ICD-10-CM

## 2025-06-30 RX ORDER — ATORVASTATIN CALCIUM 20 MG/1
20 TABLET, FILM COATED ORAL DAILY
Qty: 90 TABLET | Refills: 0 | Status: SHIPPED | OUTPATIENT
Start: 2025-06-30

## 2025-06-30 NOTE — CONFIDENTIAL NOTE
Cardiology note dated 4/15 reviewed.  Due for follow-up labs.  Limited refill.  Please help schedule.

## 2025-07-02 NOTE — TELEPHONE ENCOUNTER
Left message x2  Information to relay to patient: See provider message below and help patient schedule

## 2025-07-03 NOTE — TELEPHONE ENCOUNTER
Spoke with patient - Patient states he does not currently have insurance and hesitant to schedule lab draw without knowing cost of labs. Patient was provided with Cost of Care Estimate team phone number and will contact them. Patient will then schedule lab-only appointment accordingly.     Lipid profile lab order in chart is . Please advise if provider would like to place lab orders so patient can schedule at another time.

## 2025-07-03 NOTE — CONFIDENTIAL NOTE
Refill sent 6/29.  90-day supply.  He should plan a clinic visit when he is able to.  I would continue to bridge him but he would need to check in with us from time to time.   Demarcus Dale from [facility] pt's chart is ready for review.

## 2025-07-23 DIAGNOSIS — K21.9 GASTROESOPHAGEAL REFLUX DISEASE WITHOUT ESOPHAGITIS: ICD-10-CM

## 2025-07-24 RX ORDER — OMEPRAZOLE 20 MG/1
CAPSULE, DELAYED RELEASE ORAL
Qty: 90 CAPSULE | Refills: 1 | Status: SHIPPED | OUTPATIENT
Start: 2025-07-24

## 2025-07-24 NOTE — TELEPHONE ENCOUNTER
Please review and advise if appropriate for refill.    Last office visit: 1/4/2024    Note to Pharmacy: Medication filled one time only as pt is due for a follow-up with PCP for further refills.